# Patient Record
Sex: FEMALE | Race: WHITE | NOT HISPANIC OR LATINO | Employment: OTHER | URBAN - METROPOLITAN AREA
[De-identification: names, ages, dates, MRNs, and addresses within clinical notes are randomized per-mention and may not be internally consistent; named-entity substitution may affect disease eponyms.]

---

## 2023-03-30 DIAGNOSIS — E06.3 HYPOTHYROIDISM DUE TO HASHIMOTO'S THYROIDITIS: Primary | ICD-10-CM

## 2023-03-30 DIAGNOSIS — E03.8 HYPOTHYROIDISM DUE TO HASHIMOTO'S THYROIDITIS: Primary | ICD-10-CM

## 2023-03-30 RX ORDER — LEVOTHYROXINE SODIUM 100 UG/1
100 TABLET ORAL DAILY
Qty: 90 TABLET | Refills: 0 | Status: SHIPPED | OUTPATIENT
Start: 2023-03-30 | End: 2023-04-18 | Stop reason: SDUPTHER

## 2023-03-30 RX ORDER — LEVOTHYROXINE SODIUM 100 UG/1
1 TABLET ORAL DAILY
COMMUNITY
Start: 2012-08-21 | End: 2023-03-30 | Stop reason: SDUPTHER

## 2023-04-17 PROBLEM — G56.00 CARPAL TUNNEL SYNDROME: Status: ACTIVE | Noted: 2023-04-17

## 2023-04-17 PROBLEM — R91.8 LUNG NODULES: Status: ACTIVE | Noted: 2023-04-17

## 2023-04-17 PROBLEM — M17.0 PRIMARY OSTEOARTHRITIS OF BOTH KNEES: Status: ACTIVE | Noted: 2023-04-17

## 2023-04-17 PROBLEM — J40 BRONCHITIS: Status: ACTIVE | Noted: 2023-04-17

## 2023-04-17 PROBLEM — E03.9 HYPOTHYROIDISM: Status: ACTIVE | Noted: 2023-04-17

## 2023-04-17 PROBLEM — D64.9 ANEMIA: Status: ACTIVE | Noted: 2023-04-17

## 2023-04-17 PROBLEM — J20.9 ACUTE BRONCHITIS: Status: ACTIVE | Noted: 2023-04-17

## 2023-04-17 PROBLEM — M06.9 RHEUMATOID ARTHRITIS (MULTI): Status: ACTIVE | Noted: 2023-04-17

## 2023-04-17 PROBLEM — J44.1 COPD EXACERBATION (MULTI): Status: ACTIVE | Noted: 2023-04-17

## 2023-04-17 PROBLEM — J18.9 PNEUMONIA: Status: ACTIVE | Noted: 2023-04-17

## 2023-04-17 PROBLEM — K59.09 CHRONIC CONSTIPATION: Status: ACTIVE | Noted: 2023-04-17

## 2023-04-17 PROBLEM — R25.1 TREMOR OF BOTH HANDS: Status: ACTIVE | Noted: 2023-04-17

## 2023-04-17 PROBLEM — R06.02 SOB (SHORTNESS OF BREATH): Status: ACTIVE | Noted: 2023-04-17

## 2023-04-17 PROBLEM — G25.0 ESSENTIAL TREMOR: Status: ACTIVE | Noted: 2023-04-17

## 2023-04-17 PROBLEM — H04.123 DRY EYES: Status: ACTIVE | Noted: 2023-04-17

## 2023-04-17 PROBLEM — R26.9 ABNORMALITY OF GAIT AND MOBILITY: Status: ACTIVE | Noted: 2023-04-17

## 2023-04-17 PROBLEM — M17.9 OSTEOARTHRITIS OF KNEE: Status: ACTIVE | Noted: 2023-04-17

## 2023-04-17 PROBLEM — R73.01 IFG (IMPAIRED FASTING GLUCOSE): Status: ACTIVE | Noted: 2023-04-17

## 2023-04-17 PROBLEM — K80.20 CHOLELITHIASIS: Status: ACTIVE | Noted: 2023-04-17

## 2023-04-17 PROBLEM — R94.5 NONSPECIFIC ABNORMAL RESULTS OF FUNCTION STUDY OF LIVER: Status: ACTIVE | Noted: 2023-04-17

## 2023-04-17 PROBLEM — E86.0 DEHYDRATION: Status: ACTIVE | Noted: 2023-04-17

## 2023-04-17 PROBLEM — R25.1 TREMOR: Status: ACTIVE | Noted: 2023-04-17

## 2023-04-17 PROBLEM — M25.519 SHOULDER PAIN: Status: ACTIVE | Noted: 2023-04-17

## 2023-04-17 PROBLEM — F32.9 DEPRESSION, MAJOR: Status: ACTIVE | Noted: 2023-04-17

## 2023-04-17 PROBLEM — R32 URINARY INCONTINENCE: Status: ACTIVE | Noted: 2023-04-17

## 2023-04-17 PROBLEM — J44.9 CHRONIC OBSTRUCTIVE PULMONARY DISEASE (MULTI): Status: ACTIVE | Noted: 2023-04-17

## 2023-04-17 PROBLEM — U07.1 COVID-19 VIRUS INFECTION: Status: ACTIVE | Noted: 2023-04-17

## 2023-04-17 PROBLEM — A15.9 TUBERCULOSIS: Status: ACTIVE | Noted: 2023-04-17

## 2023-04-17 PROBLEM — E78.5 HYPERLIPIDEMIA: Status: ACTIVE | Noted: 2023-04-17

## 2023-04-17 PROBLEM — R42 VERTIGO: Status: ACTIVE | Noted: 2023-04-17

## 2023-04-17 PROBLEM — R42 DIZZINESS: Status: ACTIVE | Noted: 2023-04-17

## 2023-04-17 PROBLEM — I10 ESSENTIAL HYPERTENSION: Status: ACTIVE | Noted: 2023-04-17

## 2023-04-17 PROBLEM — F32.A DEPRESSION: Status: ACTIVE | Noted: 2023-04-17

## 2023-04-17 PROBLEM — E55.9 VITAMIN D DEFICIENCY: Status: ACTIVE | Noted: 2023-04-17

## 2023-04-17 PROBLEM — H66.90 ACUTE OTITIS MEDIA: Status: ACTIVE | Noted: 2023-04-17

## 2023-04-17 PROBLEM — G47.00 INSOMNIA: Status: ACTIVE | Noted: 2023-04-17

## 2023-04-17 PROBLEM — H92.09 EARACHE: Status: ACTIVE | Noted: 2023-04-17

## 2023-04-17 PROBLEM — R10.9 LEFT SIDED ABDOMINAL PAIN: Status: ACTIVE | Noted: 2023-04-17

## 2023-04-17 PROBLEM — M54.12 CERVICAL RADICULOPATHY: Status: ACTIVE | Noted: 2023-04-17

## 2023-04-17 RX ORDER — NEBIVOLOL 10 MG/1
10 TABLET ORAL DAILY
COMMUNITY
Start: 2023-02-21 | End: 2023-04-18 | Stop reason: SDUPTHER

## 2023-04-17 RX ORDER — ALBUTEROL SULFATE 90 UG/1
1 AEROSOL, METERED RESPIRATORY (INHALATION)
COMMUNITY
Start: 2014-11-18 | End: 2023-12-01 | Stop reason: SDUPTHER

## 2023-04-17 RX ORDER — FLUTICASONE PROPIONATE AND SALMETEROL 250; 50 UG/1; UG/1
1 POWDER RESPIRATORY (INHALATION)
COMMUNITY
Start: 2021-04-12

## 2023-04-17 RX ORDER — PROPRANOLOL HYDROCHLORIDE 10 MG/1
10 TABLET ORAL 2 TIMES DAILY
COMMUNITY
Start: 2022-05-19 | End: 2023-12-01 | Stop reason: SDUPTHER

## 2023-04-17 RX ORDER — DULOXETIN HYDROCHLORIDE 30 MG/1
30 CAPSULE, DELAYED RELEASE ORAL DAILY
COMMUNITY
Start: 2022-07-12 | End: 2023-04-18 | Stop reason: SDUPTHER

## 2023-04-17 RX ORDER — TRAMADOL HYDROCHLORIDE 50 MG/1
50 TABLET ORAL EVERY 6 HOURS
COMMUNITY
Start: 2021-04-05 | End: 2023-08-14 | Stop reason: SDUPTHER

## 2023-04-17 RX ORDER — MULTIVITAMIN
1 TABLET ORAL DAILY
COMMUNITY
Start: 2022-11-22 | End: 2023-05-23

## 2023-04-17 RX ORDER — PREDNISONE 20 MG/1
20 TABLET ORAL DAILY
COMMUNITY
Start: 2022-10-17

## 2023-04-17 RX ORDER — OMEPRAZOLE 20 MG/1
20 CAPSULE, DELAYED RELEASE ORAL DAILY
COMMUNITY
Start: 2020-09-02

## 2023-04-17 RX ORDER — DICLOFENAC SODIUM 30 MG/G
1 GEL TOPICAL 2 TIMES DAILY
COMMUNITY
Start: 2022-02-09 | End: 2023-12-01 | Stop reason: SDUPTHER

## 2023-04-17 RX ORDER — DOCUSATE SODIUM 100 MG/1
100 CAPSULE, LIQUID FILLED ORAL 2 TIMES DAILY
COMMUNITY
Start: 2022-01-04

## 2023-04-17 RX ORDER — MECLIZINE HYDROCHLORIDE 25 MG/1
25 TABLET ORAL
COMMUNITY
Start: 2017-01-27 | End: 2023-05-23

## 2023-04-17 RX ORDER — METHOTREXATE 2.5 MG/1
2.5 TABLET ORAL
COMMUNITY
Start: 2014-06-13

## 2023-04-17 RX ORDER — CALCIUM CARBONATE/VITAMIN D3 600MG-5MCG
1 TABLET ORAL 2 TIMES DAILY
COMMUNITY
Start: 2015-05-01

## 2023-04-17 RX ORDER — FLAXSEED OIL 1000 MG
1 CAPSULE ORAL
COMMUNITY
Start: 2021-10-05

## 2023-04-17 RX ORDER — ACETAMINOPHEN 500 MG
50 TABLET ORAL
COMMUNITY
Start: 2023-03-24 | End: 2023-05-23

## 2023-04-17 RX ORDER — CIPROFLOXACIN 0.5 MG/.25ML
0.25 SOLUTION/ DROPS AURICULAR (OTIC) 4 TIMES DAILY
COMMUNITY
Start: 2022-02-09

## 2023-04-17 RX ORDER — ACETAMINOPHEN 500 MG/1
1 CAPSULE, LIQUID FILLED ORAL 4 TIMES DAILY
COMMUNITY
Start: 2020-09-02

## 2023-04-17 RX ORDER — DICLOFENAC SODIUM 75 MG/1
75 TABLET, DELAYED RELEASE ORAL 2 TIMES DAILY
COMMUNITY
Start: 2023-02-20 | End: 2024-03-19 | Stop reason: SDUPTHER

## 2023-04-17 RX ORDER — POLYVINYL ALCOHOL 14 MG/ML
1 SOLUTION/ DROPS OPHTHALMIC AS NEEDED
COMMUNITY
Start: 2015-03-23

## 2023-04-17 RX ORDER — ADALIMUMAB 40MG/0.8ML
40 KIT SUBCUTANEOUS
COMMUNITY
Start: 2014-11-18

## 2023-04-17 RX ORDER — ACETAMINOPHEN 500 MG
500 TABLET ORAL
COMMUNITY
Start: 2020-09-02 | End: 2023-04-18 | Stop reason: SDUPTHER

## 2023-04-17 RX ORDER — PREDNISONE 10 MG/1
10 TABLET ORAL DAILY
COMMUNITY
Start: 2023-01-17

## 2023-04-17 RX ORDER — ATORVASTATIN CALCIUM 40 MG/1
40 TABLET, FILM COATED ORAL DAILY
COMMUNITY
Start: 2015-05-01 | End: 2023-04-18 | Stop reason: SDUPTHER

## 2023-04-17 RX ORDER — FERROUS SULFATE 325(65) MG
65 TABLET ORAL DAILY
COMMUNITY
Start: 2020-09-02 | End: 2023-12-01 | Stop reason: SDUPTHER

## 2023-04-17 RX ORDER — FLUTICASONE PROPIONATE 50 MCG
1 SPRAY, SUSPENSION (ML) NASAL DAILY
COMMUNITY
Start: 2023-02-13

## 2023-04-17 RX ORDER — ASPIRIN 81 MG/1
81 TABLET ORAL DAILY
COMMUNITY
Start: 2021-06-07 | End: 2023-05-23

## 2023-04-17 RX ORDER — IPRATROPIUM BROMIDE 17 UG/1
1 AEROSOL, METERED RESPIRATORY (INHALATION) 4 TIMES DAILY
COMMUNITY
Start: 2014-11-18

## 2023-04-17 RX ORDER — KETOROLAC TROMETHAMINE 5 MG/ML
SOLUTION OPHTHALMIC 4 TIMES DAILY
COMMUNITY
Start: 2022-10-24

## 2023-04-17 RX ORDER — OLOPATADINE HYDROCHLORIDE 1 MG/ML
1 SOLUTION/ DROPS OPHTHALMIC 2 TIMES DAILY
COMMUNITY
Start: 2022-02-09

## 2023-04-17 RX ORDER — AMMONIUM LACTATE 12 G/100G
1 CREAM TOPICAL
COMMUNITY
Start: 2020-10-05

## 2023-04-17 RX ORDER — ACETAMINOPHEN 500 MG
5 TABLET ORAL NIGHTLY
COMMUNITY
Start: 2019-03-05 | End: 2024-03-19 | Stop reason: SDUPTHER

## 2023-04-17 RX ORDER — LEVOMILNACIPRAN HYDROCHLORIDE 40 MG/1
40 CAPSULE, EXTENDED RELEASE ORAL DAILY
COMMUNITY
Start: 2023-01-17 | End: 2023-12-01 | Stop reason: SDUPTHER

## 2023-04-17 RX ORDER — ALBUTEROL SULFATE 0.83 MG/ML
2.5 SOLUTION RESPIRATORY (INHALATION) EVERY 6 HOURS PRN
COMMUNITY
Start: 2018-10-03 | End: 2023-12-01 | Stop reason: SDUPTHER

## 2023-04-17 RX ORDER — HYDROXYCHLOROQUINE SULFATE 200 MG/1
200 TABLET, FILM COATED ORAL 2 TIMES DAILY
COMMUNITY
Start: 2012-08-21 | End: 2023-12-01 | Stop reason: SDUPTHER

## 2023-04-17 RX ORDER — MULTIVIT/IRON SULF/FOLIC ACID 15MG-0.4MG
1 TABLET ORAL
COMMUNITY
Start: 2023-03-24 | End: 2023-04-18 | Stop reason: SDUPTHER

## 2023-04-17 RX ORDER — CHOLECALCIFEROL (VITAMIN D3) 50 MCG
2000 TABLET ORAL DAILY
COMMUNITY
Start: 2019-09-03 | End: 2023-12-01 | Stop reason: SDUPTHER

## 2023-04-17 RX ORDER — FOLIC ACID 1 MG/1
1 TABLET ORAL DAILY
COMMUNITY
Start: 2014-06-13 | End: 2023-12-01 | Stop reason: SDUPTHER

## 2023-04-18 ENCOUNTER — OFFICE VISIT (OUTPATIENT)
Dept: PRIMARY CARE | Facility: CLINIC | Age: 83
End: 2023-04-18
Payer: COMMERCIAL

## 2023-04-18 VITALS — SYSTOLIC BLOOD PRESSURE: 130 MMHG | TEMPERATURE: 96.8 F | DIASTOLIC BLOOD PRESSURE: 90 MMHG

## 2023-04-18 DIAGNOSIS — E03.8 HYPOTHYROIDISM DUE TO HASHIMOTO'S THYROIDITIS: ICD-10-CM

## 2023-04-18 DIAGNOSIS — I10 ESSENTIAL HYPERTENSION: ICD-10-CM

## 2023-04-18 DIAGNOSIS — J44.9 CHRONIC OBSTRUCTIVE PULMONARY DISEASE, UNSPECIFIED COPD TYPE (MULTI): ICD-10-CM

## 2023-04-18 DIAGNOSIS — M17.9 OSTEOARTHRITIS OF KNEE, UNSPECIFIED LATERALITY, UNSPECIFIED OSTEOARTHRITIS TYPE: ICD-10-CM

## 2023-04-18 DIAGNOSIS — E06.3 HYPOTHYROIDISM DUE TO HASHIMOTO'S THYROIDITIS: ICD-10-CM

## 2023-04-18 DIAGNOSIS — G25.0 ESSENTIAL TREMOR: Primary | ICD-10-CM

## 2023-04-18 DIAGNOSIS — E78.5 HYPERLIPIDEMIA, UNSPECIFIED HYPERLIPIDEMIA TYPE: ICD-10-CM

## 2023-04-18 PROCEDURE — 3075F SYST BP GE 130 - 139MM HG: CPT | Performed by: INTERNAL MEDICINE

## 2023-04-18 PROCEDURE — 99214 OFFICE O/P EST MOD 30 MIN: CPT | Performed by: INTERNAL MEDICINE

## 2023-04-18 PROCEDURE — 3080F DIAST BP >= 90 MM HG: CPT | Performed by: INTERNAL MEDICINE

## 2023-04-18 RX ORDER — LEVOTHYROXINE SODIUM 100 UG/1
100 TABLET ORAL DAILY
Qty: 90 TABLET | Refills: 0 | Status: SHIPPED | OUTPATIENT
Start: 2023-04-18 | End: 2023-07-18

## 2023-04-18 RX ORDER — MULTIVIT/IRON SULF/FOLIC ACID 15MG-0.4MG
1 TABLET ORAL DAILY
Qty: 90 TABLET | Refills: 0 | Status: SHIPPED | OUTPATIENT
Start: 2023-04-18 | End: 2023-07-18

## 2023-04-18 RX ORDER — NEBIVOLOL 10 MG/1
10 TABLET ORAL DAILY
Qty: 90 TABLET | Refills: 0 | Status: SHIPPED | OUTPATIENT
Start: 2023-04-18 | End: 2023-12-01 | Stop reason: SDUPTHER

## 2023-04-18 RX ORDER — DULOXETIN HYDROCHLORIDE 30 MG/1
30 CAPSULE, DELAYED RELEASE ORAL DAILY
Qty: 90 CAPSULE | Refills: 0 | Status: SHIPPED | OUTPATIENT
Start: 2023-04-18 | End: 2023-12-01 | Stop reason: SDUPTHER

## 2023-04-18 RX ORDER — ATORVASTATIN CALCIUM 40 MG/1
40 TABLET, FILM COATED ORAL DAILY
Qty: 90 TABLET | Refills: 0 | Status: SHIPPED | OUTPATIENT
Start: 2023-04-18 | End: 2023-07-19 | Stop reason: SDUPTHER

## 2023-04-18 RX ORDER — ACETAMINOPHEN 500 MG
500 TABLET ORAL EVERY 6 HOURS PRN
Qty: 30 TABLET | Refills: 3 | Status: SHIPPED | OUTPATIENT
Start: 2023-04-18

## 2023-04-18 ASSESSMENT — ENCOUNTER SYMPTOMS
CARDIOVASCULAR NEGATIVE: 1
RESPIRATORY NEGATIVE: 1
GASTROINTESTINAL NEGATIVE: 1
CONSTITUTIONAL NEGATIVE: 1

## 2023-04-18 NOTE — PROGRESS NOTES
Subjective   Patient ID: Makenna Black is a 82 y.o. female who presents for Follow-up.  HPI    Review of Systems   Constitutional: Negative.    Respiratory: Negative.     Cardiovascular: Negative.    Gastrointestinal: Negative.        Objective   Physical Exam  Constitutional:       Appearance: She is well-developed.   Cardiovascular:      Rate and Rhythm: Normal rate and regular rhythm.      Heart sounds: Normal heart sounds. No murmur heard.  Pulmonary:      Effort: Pulmonary effort is normal.      Breath sounds: Normal breath sounds.   Abdominal:      General: Bowel sounds are normal.      Palpations: Abdomen is soft.         Assessment/Plan   Problem List Items Addressed This Visit          Nervous    Essential tremor - Primary    Relevant Medications    levothyroxine (Synthroid, Levoxyl) 100 mcg tablet    acetaminophen (Tylenol) 500 mg tablet    atorvastatin (Lipitor) 40 mg tablet    DULoxetine (Cymbalta) 30 mg DR capsule    nebivolol (Bystolic) 10 mg tablet    multivit-iron sulf-folic acid (Tab-A-Christel Multivitamin w-iron) 15 mg iron- 400 mcg tablet       Respiratory    Chronic obstructive pulmonary disease (CMS/HCC)       Circulatory    Essential hypertension    Relevant Medications    levothyroxine (Synthroid, Levoxyl) 100 mcg tablet    acetaminophen (Tylenol) 500 mg tablet    atorvastatin (Lipitor) 40 mg tablet    DULoxetine (Cymbalta) 30 mg DR capsule    nebivolol (Bystolic) 10 mg tablet    multivit-iron sulf-folic acid (Tab-A-Christel Multivitamin w-iron) 15 mg iron- 400 mcg tablet       Musculoskeletal    Osteoarthritis of knee    Relevant Medications    levothyroxine (Synthroid, Levoxyl) 100 mcg tablet    acetaminophen (Tylenol) 500 mg tablet    atorvastatin (Lipitor) 40 mg tablet    DULoxetine (Cymbalta) 30 mg DR capsule    nebivolol (Bystolic) 10 mg tablet    multivit-iron sulf-folic acid (Tab-A-Christel Multivitamin w-iron) 15 mg iron- 400 mcg tablet       Endocrine/Metabolic    Hypothyroidism    Relevant  Medications    levothyroxine (Synthroid, Levoxyl) 100 mcg tablet    acetaminophen (Tylenol) 500 mg tablet    atorvastatin (Lipitor) 40 mg tablet    DULoxetine (Cymbalta) 30 mg DR capsule    nebivolol (Bystolic) 10 mg tablet    multivit-iron sulf-folic acid (Tab-A-Christel Multivitamin w-iron) 15 mg iron- 400 mcg tablet       Other    Hyperlipidemia        HTN.  Well controlled.  Continue with current medications.  Check BP at home daily.  Report to us if the numbers are higher than 130/80.      HLD  Stable  Continue with statins  Check lipids  The patient is here for a follow up on chronic medical problems.  His medications were reviewed, pharmacy updated, notes reviewed, prior labs reviewed.  Lab Results   Component Value Date    WBC 8.1 10/11/2022    HGB 14.0 10/11/2022    HCT 42.5 10/11/2022     10/11/2022    CHOL 234 (H) 10/11/2022    TRIG 119 10/11/2022    HDL 80.4 10/11/2022    ALT 28 01/17/2023    AST 18 01/17/2023     01/17/2023    K 4.6 01/17/2023     01/17/2023    CREATININE 0.64 01/17/2023    BUN 21 01/17/2023    CO2 31 01/17/2023    TSH 0.63 01/17/2023    HGBA1C 5.6 01/17/2023     par  MDM  1) COMPLEXITY: MORE THAN 1 STABLE CHRONIC CONDITION ADDRESSED OR 1 ACUTE ILLNESS ADDRESSED   2)DATA: TESTS INTERPRETED AND OR ORDERED, TOOK INDEPENDENT HISTORY OR RECORDS REVIEWED  3)RISK: MODERATE RISK DUE TO NATURE OF MEDICAL CONDITIONS/COMORBIDITY OR MEDICATIONS ORDERED OR SURGICAL OR PROCEDURE REFERRAL         Ana M Gongora MD

## 2023-04-21 DIAGNOSIS — R26.9 ABNORMALITY OF GAIT AND MOBILITY: ICD-10-CM

## 2023-04-21 DIAGNOSIS — G56.00 CARPAL TUNNEL SYNDROME, UNSPECIFIED LATERALITY: ICD-10-CM

## 2023-05-23 DIAGNOSIS — E55.9 VITAMIN D DEFICIENCY: ICD-10-CM

## 2023-05-23 DIAGNOSIS — R42 VERTIGO: Primary | ICD-10-CM

## 2023-05-23 RX ORDER — ACETAMINOPHEN 500 MG
TABLET ORAL
Qty: 30 CAPSULE | Refills: 2 | Status: SHIPPED | OUTPATIENT
Start: 2023-05-23 | End: 2023-08-14

## 2023-05-23 RX ORDER — ASPIRIN 81 MG/1
TABLET ORAL
Qty: 30 TABLET | Refills: 2 | Status: SHIPPED | OUTPATIENT
Start: 2023-05-23 | End: 2023-08-14

## 2023-05-23 RX ORDER — MULTIVITAMIN
TABLET ORAL
Qty: 60 TABLET | Refills: 2 | Status: SHIPPED | OUTPATIENT
Start: 2023-05-23 | End: 2023-08-14

## 2023-05-23 RX ORDER — MECLIZINE HYDROCHLORIDE 25 MG/1
TABLET ORAL
Qty: 90 TABLET | Refills: 0 | Status: SHIPPED | OUTPATIENT
Start: 2023-05-23

## 2023-07-06 DIAGNOSIS — M06.9 RHEUMATOID ARTHRITIS, INVOLVING UNSPECIFIED SITE, UNSPECIFIED WHETHER RHEUMATOID FACTOR PRESENT (MULTI): ICD-10-CM

## 2023-07-06 DIAGNOSIS — R26.9 ABNORMALITY OF GAIT AND MOBILITY: ICD-10-CM

## 2023-07-06 DIAGNOSIS — J44.1 COPD EXACERBATION (MULTI): ICD-10-CM

## 2023-07-17 DIAGNOSIS — G25.0 ESSENTIAL TREMOR: ICD-10-CM

## 2023-07-17 DIAGNOSIS — E03.8 HYPOTHYROIDISM DUE TO HASHIMOTO'S THYROIDITIS: ICD-10-CM

## 2023-07-17 DIAGNOSIS — M17.9 OSTEOARTHRITIS OF KNEE, UNSPECIFIED LATERALITY, UNSPECIFIED OSTEOARTHRITIS TYPE: ICD-10-CM

## 2023-07-17 DIAGNOSIS — E06.3 HYPOTHYROIDISM DUE TO HASHIMOTO'S THYROIDITIS: ICD-10-CM

## 2023-07-17 DIAGNOSIS — D64.9 ANEMIA, UNSPECIFIED TYPE: ICD-10-CM

## 2023-07-17 DIAGNOSIS — I10 ESSENTIAL HYPERTENSION: ICD-10-CM

## 2023-07-18 RX ORDER — MULTIVIT/IRON SULF/FOLIC ACID 15MG-0.4MG
1 TABLET ORAL DAILY
Qty: 90 TABLET | Refills: 0 | Status: SHIPPED | OUTPATIENT
Start: 2023-07-18 | End: 2023-10-03

## 2023-07-18 RX ORDER — LEVOTHYROXINE SODIUM 100 UG/1
100 TABLET ORAL DAILY
Qty: 90 TABLET | Refills: 0 | Status: SHIPPED | OUTPATIENT
Start: 2023-07-18 | End: 2023-09-05

## 2023-07-19 DIAGNOSIS — E06.3 HYPOTHYROIDISM DUE TO HASHIMOTO'S THYROIDITIS: ICD-10-CM

## 2023-07-19 DIAGNOSIS — I10 ESSENTIAL HYPERTENSION: ICD-10-CM

## 2023-07-19 DIAGNOSIS — E03.8 HYPOTHYROIDISM DUE TO HASHIMOTO'S THYROIDITIS: ICD-10-CM

## 2023-07-19 DIAGNOSIS — G25.0 ESSENTIAL TREMOR: ICD-10-CM

## 2023-07-19 DIAGNOSIS — E78.5 HYPERLIPIDEMIA, UNSPECIFIED HYPERLIPIDEMIA TYPE: ICD-10-CM

## 2023-07-19 DIAGNOSIS — M17.9 OSTEOARTHRITIS OF KNEE, UNSPECIFIED LATERALITY, UNSPECIFIED OSTEOARTHRITIS TYPE: ICD-10-CM

## 2023-07-19 RX ORDER — ATORVASTATIN CALCIUM 40 MG/1
40 TABLET, FILM COATED ORAL DAILY
Qty: 90 TABLET | Refills: 0 | Status: SHIPPED | OUTPATIENT
Start: 2023-07-19 | End: 2023-10-04

## 2023-08-13 DIAGNOSIS — E55.9 VITAMIN D DEFICIENCY: ICD-10-CM

## 2023-08-13 DIAGNOSIS — R42 VERTIGO: ICD-10-CM

## 2023-08-14 ENCOUNTER — OFFICE VISIT (OUTPATIENT)
Dept: PRIMARY CARE | Facility: CLINIC | Age: 83
End: 2023-08-14
Payer: COMMERCIAL

## 2023-08-14 ENCOUNTER — LAB (OUTPATIENT)
Dept: LAB | Facility: LAB | Age: 83
End: 2023-08-14
Payer: COMMERCIAL

## 2023-08-14 VITALS — WEIGHT: 186 LBS | BODY MASS INDEX: 36.33 KG/M2 | SYSTOLIC BLOOD PRESSURE: 140 MMHG | DIASTOLIC BLOOD PRESSURE: 90 MMHG

## 2023-08-14 DIAGNOSIS — E03.8 HYPOTHYROIDISM DUE TO HASHIMOTO'S THYROIDITIS: ICD-10-CM

## 2023-08-14 DIAGNOSIS — E78.5 HYPERLIPIDEMIA, UNSPECIFIED HYPERLIPIDEMIA TYPE: ICD-10-CM

## 2023-08-14 DIAGNOSIS — M06.9 RHEUMATOID ARTHRITIS, INVOLVING UNSPECIFIED SITE, UNSPECIFIED WHETHER RHEUMATOID FACTOR PRESENT (MULTI): ICD-10-CM

## 2023-08-14 DIAGNOSIS — E06.3 HYPOTHYROIDISM DUE TO HASHIMOTO'S THYROIDITIS: ICD-10-CM

## 2023-08-14 DIAGNOSIS — I10 ESSENTIAL HYPERTENSION: Primary | ICD-10-CM

## 2023-08-14 DIAGNOSIS — I10 ESSENTIAL HYPERTENSION: ICD-10-CM

## 2023-08-14 DIAGNOSIS — R73.01 IFG (IMPAIRED FASTING GLUCOSE): ICD-10-CM

## 2023-08-14 DIAGNOSIS — M17.0 PRIMARY OSTEOARTHRITIS OF BOTH KNEES: ICD-10-CM

## 2023-08-14 DIAGNOSIS — E55.9 VITAMIN D DEFICIENCY: ICD-10-CM

## 2023-08-14 LAB
ALANINE AMINOTRANSFERASE (SGPT) (U/L) IN SER/PLAS: 21 U/L (ref 7–45)
ALBUMIN (G/DL) IN SER/PLAS: 4.4 G/DL (ref 3.4–5)
ALKALINE PHOSPHATASE (U/L) IN SER/PLAS: 43 U/L (ref 33–136)
ANION GAP IN SER/PLAS: 14 MMOL/L (ref 10–20)
ASPARTATE AMINOTRANSFERASE (SGOT) (U/L) IN SER/PLAS: 21 U/L (ref 9–39)
BILIRUBIN TOTAL (MG/DL) IN SER/PLAS: 0.4 MG/DL (ref 0–1.2)
CALCIDIOL (25 OH VITAMIN D3) (NG/ML) IN SER/PLAS: 77 NG/ML
CALCIUM (MG/DL) IN SER/PLAS: 9.9 MG/DL (ref 8.6–10.6)
CARBON DIOXIDE, TOTAL (MMOL/L) IN SER/PLAS: 29 MMOL/L (ref 21–32)
CHLORIDE (MMOL/L) IN SER/PLAS: 104 MMOL/L (ref 98–107)
CHOLESTEROL (MG/DL) IN SER/PLAS: 247 MG/DL (ref 0–199)
CHOLESTEROL IN HDL (MG/DL) IN SER/PLAS: 77.3 MG/DL
CHOLESTEROL/HDL RATIO: 3.2
CREATININE (MG/DL) IN SER/PLAS: 0.68 MG/DL (ref 0.5–1.05)
ERYTHROCYTE DISTRIBUTION WIDTH (RATIO) BY AUTOMATED COUNT: 13.6 % (ref 11.5–14.5)
ERYTHROCYTE MEAN CORPUSCULAR HEMOGLOBIN CONCENTRATION (G/DL) BY AUTOMATED: 31.5 G/DL (ref 32–36)
ERYTHROCYTE MEAN CORPUSCULAR VOLUME (FL) BY AUTOMATED COUNT: 94 FL (ref 80–100)
ERYTHROCYTES (10*6/UL) IN BLOOD BY AUTOMATED COUNT: 4.75 X10E12/L (ref 4–5.2)
ESTIMATED AVERAGE GLUCOSE FOR HBA1C: 108 MG/DL
GFR FEMALE: 87 ML/MIN/1.73M2
GLUCOSE (MG/DL) IN SER/PLAS: 96 MG/DL (ref 74–99)
HEMATOCRIT (%) IN BLOOD BY AUTOMATED COUNT: 44.7 % (ref 36–46)
HEMOGLOBIN (G/DL) IN BLOOD: 14.1 G/DL (ref 12–16)
HEMOGLOBIN A1C/HEMOGLOBIN TOTAL IN BLOOD: 5.4 %
LDL: 139 MG/DL (ref 0–99)
LEUKOCYTES (10*3/UL) IN BLOOD BY AUTOMATED COUNT: 6.7 X10E9/L (ref 4.4–11.3)
NRBC (PER 100 WBCS) BY AUTOMATED COUNT: 0 /100 WBC (ref 0–0)
PLATELETS (10*3/UL) IN BLOOD AUTOMATED COUNT: 243 X10E9/L (ref 150–450)
POTASSIUM (MMOL/L) IN SER/PLAS: 4.4 MMOL/L (ref 3.5–5.3)
PROTEIN TOTAL: 6.9 G/DL (ref 6.4–8.2)
SODIUM (MMOL/L) IN SER/PLAS: 143 MMOL/L (ref 136–145)
THYROTROPIN (MIU/L) IN SER/PLAS BY DETECTION LIMIT <= 0.05 MIU/L: 0.5 MIU/L (ref 0.44–3.98)
TRIGLYCERIDE (MG/DL) IN SER/PLAS: 152 MG/DL (ref 0–149)
UREA NITROGEN (MG/DL) IN SER/PLAS: 10 MG/DL (ref 6–23)
VLDL: 30 MG/DL (ref 0–40)

## 2023-08-14 PROCEDURE — 82306 VITAMIN D 25 HYDROXY: CPT

## 2023-08-14 PROCEDURE — 85027 COMPLETE CBC AUTOMATED: CPT

## 2023-08-14 PROCEDURE — 83036 HEMOGLOBIN GLYCOSYLATED A1C: CPT

## 2023-08-14 PROCEDURE — 80061 LIPID PANEL: CPT

## 2023-08-14 PROCEDURE — 80053 COMPREHEN METABOLIC PANEL: CPT

## 2023-08-14 PROCEDURE — 36415 COLL VENOUS BLD VENIPUNCTURE: CPT

## 2023-08-14 PROCEDURE — 84443 ASSAY THYROID STIM HORMONE: CPT

## 2023-08-14 PROCEDURE — 3077F SYST BP >= 140 MM HG: CPT | Performed by: INTERNAL MEDICINE

## 2023-08-14 PROCEDURE — 99214 OFFICE O/P EST MOD 30 MIN: CPT | Performed by: INTERNAL MEDICINE

## 2023-08-14 PROCEDURE — 3080F DIAST BP >= 90 MM HG: CPT | Performed by: INTERNAL MEDICINE

## 2023-08-14 RX ORDER — ACETAMINOPHEN 500 MG
TABLET ORAL
Qty: 30 CAPSULE | Refills: 2 | Status: SHIPPED | OUTPATIENT
Start: 2023-08-14 | End: 2023-10-31

## 2023-08-14 RX ORDER — MULTIVITAMIN
TABLET ORAL
Qty: 60 TABLET | Refills: 2 | Status: SHIPPED | OUTPATIENT
Start: 2023-08-14 | End: 2023-09-26

## 2023-08-14 RX ORDER — CELECOXIB 100 MG/1
100 CAPSULE ORAL 2 TIMES DAILY
Qty: 60 CAPSULE | Refills: 2 | Status: SHIPPED | OUTPATIENT
Start: 2023-08-14 | End: 2023-11-12

## 2023-08-14 RX ORDER — ASPIRIN 81 MG/1
TABLET ORAL
Qty: 30 TABLET | Refills: 2 | Status: SHIPPED | OUTPATIENT
Start: 2023-08-14 | End: 2023-10-31

## 2023-08-14 RX ORDER — TRAMADOL HYDROCHLORIDE 50 MG/1
50 TABLET ORAL EVERY 8 HOURS PRN
Qty: 18 TABLET | Refills: 0 | Status: SHIPPED | OUTPATIENT
Start: 2023-08-14 | End: 2023-08-20

## 2023-08-14 ASSESSMENT — ENCOUNTER SYMPTOMS
ARTHRALGIAS: 1
BACK PAIN: 1
CARDIOVASCULAR NEGATIVE: 1
GASTROINTESTINAL NEGATIVE: 1
CONSTITUTIONAL NEGATIVE: 1
RESPIRATORY NEGATIVE: 1

## 2023-08-14 NOTE — PROGRESS NOTES
Subjective   Patient ID: Makenna Black is a 83 y.o. female who presents for Follow-up.  HPI    Review of Systems   Constitutional: Negative.    Respiratory: Negative.     Cardiovascular: Negative.    Gastrointestinal: Negative.    Musculoskeletal:  Positive for arthralgias, back pain and gait problem.       Objective   Physical Exam  Constitutional:       Appearance: She is well-developed.   Cardiovascular:      Rate and Rhythm: Normal rate and regular rhythm.      Heart sounds: Normal heart sounds. No murmur heard.  Pulmonary:      Effort: Pulmonary effort is normal.      Breath sounds: Normal breath sounds.   Abdominal:      General: Bowel sounds are normal.      Palpations: Abdomen is soft.     Significant OA changes of all joints    Assessment/Plan   Problem List Items Addressed This Visit       Essential hypertension - Primary    Relevant Orders    CBC    Comprehensive Metabolic Panel    TSH with reflex to Free T4 if abnormal    Hemoglobin A1C    Lipid Panel    Vitamin D, Total    Hyperlipidemia    Relevant Orders    CBC    Comprehensive Metabolic Panel    TSH with reflex to Free T4 if abnormal    Hemoglobin A1C    Lipid Panel    Vitamin D, Total    Hypothyroidism    Relevant Orders    CBC    Comprehensive Metabolic Panel    TSH with reflex to Free T4 if abnormal    Hemoglobin A1C    Lipid Panel    Vitamin D, Total    IFG (impaired fasting glucose)    Relevant Orders    CBC    Comprehensive Metabolic Panel    TSH with reflex to Free T4 if abnormal    Hemoglobin A1C    Lipid Panel    Vitamin D, Total    Primary osteoarthritis of both knees    Relevant Orders    Referral to Physical Therapy    Rheumatoid arthritis (CMS/[facility])    Relevant Medications    celecoxib (CeleBREX) 100 mg capsule    traMADol (Ultram) 50 mg tablet    Vitamin D deficiency    Relevant Orders    CBC    Comprehensive Metabolic Panel    TSH with reflex to Free T4 if abnormal    Hemoglobin A1C    Lipid Panel    Vitamin D, Total     The patient is here  for a follow up on chronic medical problems.  His medications were reviewed, pharmacy updated, notes reviewed, prior labs reviewed.    Severe OA  Worsening significantly  Recommend Celebrex BID and Tramadol for severe pain  She has seen ortho before and was told it's too risky to do the surgery on her       HTN.  Well controlled.  Continue with current medications.  Check BP at home daily.  Report to us if the numbers are higher than 130/80.        HLD  Stable  Continue with statins  Check lipids             Ana M Gongora MD

## 2023-08-15 NOTE — RESULT ENCOUNTER NOTE
Results reviewed.  There are some minor abnormalities, which are not concerning.  No changes in medications are needed at this time.  Please continue with current treatment.    Best,  Dr. Viramontes

## 2023-09-05 DIAGNOSIS — E03.8 HYPOTHYROIDISM DUE TO HASHIMOTO'S THYROIDITIS: ICD-10-CM

## 2023-09-05 DIAGNOSIS — E06.3 HYPOTHYROIDISM DUE TO HASHIMOTO'S THYROIDITIS: ICD-10-CM

## 2023-09-05 RX ORDER — LEVOTHYROXINE SODIUM 100 UG/1
100 TABLET ORAL DAILY
Qty: 90 TABLET | Refills: 0 | Status: SHIPPED | OUTPATIENT
Start: 2023-09-05 | End: 2023-10-25

## 2023-09-26 DIAGNOSIS — E55.9 VITAMIN D DEFICIENCY: ICD-10-CM

## 2023-09-26 DIAGNOSIS — R42 VERTIGO: ICD-10-CM

## 2023-09-26 RX ORDER — MULTIVITAMIN
TABLET ORAL
Qty: 60 TABLET | Refills: 2 | Status: SHIPPED | OUTPATIENT
Start: 2023-09-26 | End: 2023-12-01 | Stop reason: SDUPTHER

## 2023-10-03 DIAGNOSIS — D64.9 ANEMIA, UNSPECIFIED TYPE: ICD-10-CM

## 2023-10-03 RX ORDER — MULTIVIT/IRON SULF/FOLIC ACID 15MG-0.4MG
1 TABLET ORAL DAILY
Qty: 30 TABLET | Refills: 2 | Status: SHIPPED | OUTPATIENT
Start: 2023-10-03 | End: 2023-10-25

## 2023-10-04 DIAGNOSIS — E78.5 HYPERLIPIDEMIA, UNSPECIFIED HYPERLIPIDEMIA TYPE: ICD-10-CM

## 2023-10-04 RX ORDER — ATORVASTATIN CALCIUM 40 MG/1
40 TABLET, FILM COATED ORAL DAILY
Qty: 90 TABLET | Refills: 0 | Status: SHIPPED | OUTPATIENT
Start: 2023-10-04 | End: 2023-10-31

## 2023-10-31 DIAGNOSIS — E55.9 VITAMIN D DEFICIENCY: ICD-10-CM

## 2023-10-31 DIAGNOSIS — E78.5 HYPERLIPIDEMIA, UNSPECIFIED HYPERLIPIDEMIA TYPE: ICD-10-CM

## 2023-10-31 DIAGNOSIS — R42 VERTIGO: ICD-10-CM

## 2023-10-31 RX ORDER — ACETAMINOPHEN 500 MG
TABLET ORAL
Qty: 30 CAPSULE | Refills: 2 | Status: SHIPPED | OUTPATIENT
Start: 2023-10-31 | End: 2024-04-15 | Stop reason: SDUPTHER

## 2023-10-31 RX ORDER — ATORVASTATIN CALCIUM 40 MG/1
40 TABLET, FILM COATED ORAL DAILY
Qty: 90 TABLET | Refills: 0 | Status: SHIPPED | OUTPATIENT
Start: 2023-10-31 | End: 2023-12-01 | Stop reason: SDUPTHER

## 2023-10-31 RX ORDER — ASPIRIN 81 MG/1
TABLET ORAL
Qty: 30 TABLET | Refills: 2 | Status: SHIPPED | OUTPATIENT
Start: 2023-10-31 | End: 2023-12-01 | Stop reason: SDUPTHER

## 2023-11-08 ENCOUNTER — TELEMEDICINE (OUTPATIENT)
Dept: PRIMARY CARE | Facility: CLINIC | Age: 83
End: 2023-11-08
Payer: COMMERCIAL

## 2023-11-08 DIAGNOSIS — B00.9 HERPES SIMPLEX: Primary | ICD-10-CM

## 2023-11-08 PROCEDURE — 99213 OFFICE O/P EST LOW 20 MIN: CPT | Performed by: INTERNAL MEDICINE

## 2023-11-08 RX ORDER — VALACYCLOVIR HYDROCHLORIDE 500 MG/1
500 TABLET, FILM COATED ORAL 2 TIMES DAILY
Qty: 14 TABLET | Refills: 2 | Status: SHIPPED | OUTPATIENT
Start: 2023-11-08 | End: 2023-11-29

## 2023-11-08 ASSESSMENT — ENCOUNTER SYMPTOMS
CONSTITUTIONAL NEGATIVE: 1
GASTROINTESTINAL NEGATIVE: 1
RESPIRATORY NEGATIVE: 1
CARDIOVASCULAR NEGATIVE: 1

## 2023-11-08 NOTE — PROGRESS NOTES
Subjective   Patient ID: Makenna Black is a 83 y.o. female who presents for Follow-up.  HPI    Review of Systems   Constitutional: Negative.    Respiratory: Negative.     Cardiovascular: Negative.    Gastrointestinal: Negative.        Objective   Physical Exam  Neurological:      Mental Status: She is alert.   Psychiatric:         Mood and Affect: Mood normal.         Assessment/Plan   Problem List Items Addressed This Visit    None  Visit Diagnoses       Herpes simplex    -  Primary    Relevant Medications    valACYclovir (Valtrex) 500 mg tablet          Herpes labialis  Valtrex called in  Avoid direct contact    Back pain  Getting acupuncture and is helping a lot  Continue with current    An interactive audio and video telecommunication system which permits real time communications between the patient (at the originating site) and provider (at the distant site) was utilized to provide this telehealth service.    Verbal consent was requested and obtained from the patient on the day of encounter.         Ana M Gongora MD

## 2023-11-20 ENCOUNTER — APPOINTMENT (OUTPATIENT)
Dept: PRIMARY CARE | Facility: CLINIC | Age: 83
End: 2023-11-20
Payer: COMMERCIAL

## 2023-12-01 ENCOUNTER — OFFICE VISIT (OUTPATIENT)
Dept: PRIMARY CARE | Facility: CLINIC | Age: 83
End: 2023-12-01
Payer: COMMERCIAL

## 2023-12-01 VITALS
HEART RATE: 76 BPM | DIASTOLIC BLOOD PRESSURE: 86 MMHG | WEIGHT: 189 LBS | BODY MASS INDEX: 36.91 KG/M2 | SYSTOLIC BLOOD PRESSURE: 147 MMHG

## 2023-12-01 DIAGNOSIS — E55.9 VITAMIN D DEFICIENCY: ICD-10-CM

## 2023-12-01 DIAGNOSIS — E78.5 HYPERLIPIDEMIA, UNSPECIFIED HYPERLIPIDEMIA TYPE: ICD-10-CM

## 2023-12-01 DIAGNOSIS — Z00.00 ROUTINE GENERAL MEDICAL EXAMINATION AT A HEALTH CARE FACILITY: ICD-10-CM

## 2023-12-01 DIAGNOSIS — F32.4 MAJOR DEPRESSIVE DISORDER IN PARTIAL REMISSION, UNSPECIFIED WHETHER RECURRENT (CMS-HCC): ICD-10-CM

## 2023-12-01 DIAGNOSIS — J44.9 CHRONIC OBSTRUCTIVE PULMONARY DISEASE, UNSPECIFIED COPD TYPE (MULTI): ICD-10-CM

## 2023-12-01 DIAGNOSIS — J84.10 PULMONARY FIBROSIS (MULTI): ICD-10-CM

## 2023-12-01 DIAGNOSIS — M17.9 OSTEOARTHRITIS OF KNEE, UNSPECIFIED LATERALITY, UNSPECIFIED OSTEOARTHRITIS TYPE: ICD-10-CM

## 2023-12-01 DIAGNOSIS — G25.0 ESSENTIAL TREMOR: ICD-10-CM

## 2023-12-01 DIAGNOSIS — D64.9 ANEMIA, UNSPECIFIED TYPE: ICD-10-CM

## 2023-12-01 DIAGNOSIS — R42 VERTIGO: ICD-10-CM

## 2023-12-01 DIAGNOSIS — I27.21 PULMONARY ARTERIAL HYPERTENSION (MULTI): ICD-10-CM

## 2023-12-01 DIAGNOSIS — M17.0 PRIMARY OSTEOARTHRITIS OF BOTH KNEES: ICD-10-CM

## 2023-12-01 DIAGNOSIS — E06.3 HYPOTHYROIDISM DUE TO HASHIMOTO'S THYROIDITIS: ICD-10-CM

## 2023-12-01 DIAGNOSIS — I10 ESSENTIAL HYPERTENSION: Primary | ICD-10-CM

## 2023-12-01 DIAGNOSIS — E03.8 HYPOTHYROIDISM DUE TO HASHIMOTO'S THYROIDITIS: ICD-10-CM

## 2023-12-01 DIAGNOSIS — M06.9 RHEUMATOID ARTHRITIS, INVOLVING UNSPECIFIED SITE, UNSPECIFIED WHETHER RHEUMATOID FACTOR PRESENT (MULTI): ICD-10-CM

## 2023-12-01 DIAGNOSIS — R73.01 IFG (IMPAIRED FASTING GLUCOSE): ICD-10-CM

## 2023-12-01 DIAGNOSIS — E66.01 OBESITY, MORBID (MULTI): ICD-10-CM

## 2023-12-01 DIAGNOSIS — E61.1 IRON DEFICIENCY: ICD-10-CM

## 2023-12-01 PROCEDURE — 99214 OFFICE O/P EST MOD 30 MIN: CPT | Performed by: INTERNAL MEDICINE

## 2023-12-01 PROCEDURE — 3079F DIAST BP 80-89 MM HG: CPT | Performed by: INTERNAL MEDICINE

## 2023-12-01 PROCEDURE — 3077F SYST BP >= 140 MM HG: CPT | Performed by: INTERNAL MEDICINE

## 2023-12-01 RX ORDER — LEVOTHYROXINE SODIUM 100 UG/1
100 TABLET ORAL DAILY
Qty: 90 TABLET | Refills: 0 | Status: SHIPPED | OUTPATIENT
Start: 2023-12-01 | End: 2024-01-15 | Stop reason: SDUPTHER

## 2023-12-01 RX ORDER — ALBUTEROL SULFATE 90 UG/1
1 AEROSOL, METERED RESPIRATORY (INHALATION) EVERY 4 HOURS PRN
Qty: 18 G | Refills: 3 | Status: SHIPPED | OUTPATIENT
Start: 2023-12-01 | End: 2024-03-18

## 2023-12-01 RX ORDER — ATORVASTATIN CALCIUM 40 MG/1
40 TABLET, FILM COATED ORAL DAILY
Qty: 90 TABLET | Refills: 0 | Status: SHIPPED | OUTPATIENT
Start: 2023-12-01 | End: 2024-06-03

## 2023-12-01 RX ORDER — DICLOFENAC SODIUM 30 MG/G
1 GEL TOPICAL 2 TIMES DAILY PRN
Qty: 100 G | Refills: 2 | Status: SHIPPED | OUTPATIENT
Start: 2023-12-01 | End: 2023-12-04 | Stop reason: SDUPTHER

## 2023-12-01 RX ORDER — LEVOMILNACIPRAN HYDROCHLORIDE 40 MG/1
40 CAPSULE, EXTENDED RELEASE ORAL DAILY
Qty: 30 CAPSULE | Refills: 2 | Status: SHIPPED | OUTPATIENT
Start: 2023-12-01 | End: 2023-12-04 | Stop reason: SDUPTHER

## 2023-12-01 RX ORDER — HYDROXYCHLOROQUINE SULFATE 200 MG/1
200 TABLET, FILM COATED ORAL 2 TIMES DAILY
Qty: 60 TABLET | Refills: 2 | Status: SHIPPED | OUTPATIENT
Start: 2023-12-01 | End: 2024-02-29

## 2023-12-01 RX ORDER — ASPIRIN 81 MG/1
81 TABLET ORAL DAILY
Qty: 30 TABLET | Refills: 2 | Status: SHIPPED | OUTPATIENT
Start: 2023-12-01 | End: 2024-04-08

## 2023-12-01 RX ORDER — NEBIVOLOL 10 MG/1
10 TABLET ORAL DAILY
Qty: 90 TABLET | Refills: 0 | Status: SHIPPED | OUTPATIENT
Start: 2023-12-01

## 2023-12-01 RX ORDER — CHOLECALCIFEROL (VITAMIN D3) 50 MCG
2000 TABLET ORAL DAILY
Qty: 30 TABLET | Refills: 2 | Status: SHIPPED | OUTPATIENT
Start: 2023-12-01 | End: 2024-02-29

## 2023-12-01 RX ORDER — FOLIC ACID 1 MG/1
1 TABLET ORAL DAILY
Qty: 30 TABLET | Refills: 2 | Status: SHIPPED | OUTPATIENT
Start: 2023-12-01

## 2023-12-01 RX ORDER — FERROUS SULFATE 325(65) MG
65 TABLET ORAL DAILY
Qty: 30 TABLET | Refills: 2 | Status: SHIPPED | OUTPATIENT
Start: 2023-12-01 | End: 2024-02-20

## 2023-12-01 RX ORDER — DULOXETIN HYDROCHLORIDE 30 MG/1
30 CAPSULE, DELAYED RELEASE ORAL DAILY
Qty: 90 CAPSULE | Refills: 0 | Status: SHIPPED | OUTPATIENT
Start: 2023-12-01

## 2023-12-01 RX ORDER — MULTIVITAMIN
2 TABLET ORAL DAILY
Qty: 60 TABLET | Refills: 2 | Status: SHIPPED | OUTPATIENT
Start: 2023-12-01 | End: 2024-02-20

## 2023-12-01 RX ORDER — PROPRANOLOL HYDROCHLORIDE 10 MG/1
10 TABLET ORAL 2 TIMES DAILY
Qty: 60 TABLET | Refills: 2 | Status: SHIPPED | OUTPATIENT
Start: 2023-12-01 | End: 2024-03-11

## 2023-12-01 RX ORDER — ALBUTEROL SULFATE 0.83 MG/ML
2.5 SOLUTION RESPIRATORY (INHALATION) EVERY 6 HOURS PRN
Qty: 75 ML | Refills: 3 | Status: SHIPPED | OUTPATIENT
Start: 2023-12-01

## 2023-12-01 ASSESSMENT — PATIENT HEALTH QUESTIONNAIRE - PHQ9
SUM OF ALL RESPONSES TO PHQ9 QUESTIONS 1 AND 2: 0
2. FEELING DOWN, DEPRESSED OR HOPELESS: NOT AT ALL
1. LITTLE INTEREST OR PLEASURE IN DOING THINGS: NOT AT ALL

## 2023-12-01 NOTE — PROGRESS NOTES
Subjective   Patient ID: Makenna Black is a 83 y.o. female who presents for Follow-up and med refill. She is [place] speaking and the visit was conducted with the use of a . She has a history of osteoarthritis, HTN, HLD, hypothyroidism, rheumatoid arthritis, pulmonary fibrosis and nodules 2/2 rheumatoid arthritis, hypovitaminosis D, and impaired fasting glucose. She follows with pulmonolgy for her pulmonary fibrosis, has previously been prescribed inhalers for her fibrosis, but she states that they cause her to choke/cough and as a result, she uses her albuterol twice daily. We discussed the normal use of albuterol and made recommendations regarding her use of inhalers. Her pressures at home are elevated up to the 180s, but she states that the sphygmomanometer is unreliable. Previously, she had been seen by orthopedics and rheumatology, but she has not seen them recently as she does not feel that they do much to help her. Her pain is improved with acupuncture, which she has been doing for the last 3 months.     ROS negative    Objective   /86   Pulse 76   Wt 85.7 kg (189 lb)   BMI 36.91 kg/m²    Lab Results   Component Value Date    WBC 6.7 08/14/2023    HGB 14.1 08/14/2023    HCT 44.7 08/14/2023    MCV 94 08/14/2023     08/14/2023      Lab Results   Component Value Date    GLUCOSE 96 08/14/2023    CALCIUM 9.9 08/14/2023     08/14/2023    K 4.4 08/14/2023    CO2 29 08/14/2023     08/14/2023    BUN 10 08/14/2023    CREATININE 0.68 08/14/2023      Physical Exam  Constitutional:       Appearance: Normal appearance. She is obese.   HENT:      Head: Normocephalic and atraumatic.      Nose: Nose normal.      Mouth/Throat:      Mouth: Mucous membranes are moist.      Pharynx: Oropharynx is clear.   Eyes:      Extraocular Movements: Extraocular movements intact.      Conjunctiva/sclera: Conjunctivae normal.      Pupils: Pupils are equal, round, and reactive to light.   Cardiovascular:       Rate and Rhythm: Normal rate and regular rhythm.      Pulses: Normal pulses.      Heart sounds: Normal heart sounds. No murmur heard.     No friction rub. No gallop.   Pulmonary:      Effort: Pulmonary effort is normal.      Breath sounds: Normal breath sounds. No wheezing, rhonchi or rales.   Abdominal:      General: Abdomen is flat. Bowel sounds are normal. There is no distension.      Palpations: Abdomen is soft.      Tenderness: There is no abdominal tenderness. There is no guarding or rebound.   Musculoskeletal:         General: Normal range of motion.      Cervical back: Normal range of motion.      Right lower leg: Edema present.      Left lower leg: Edema present.      Comments: 1+ pitting edema bilaterally   Skin:     General: Skin is warm and dry.      Capillary Refill: Capillary refill takes less than 2 seconds.   Neurological:      General: No focal deficit present.      Mental Status: She is alert and oriented to person, place, and time.       Assessment/Plan   Problem List Items Addressed This Visit       Chronic obstructive pulmonary disease (CMS/HCC)    Essential hypertension - Primary    Essential tremor    Hyperlipidemia    Hypothyroidism    IFG (impaired fasting glucose)    Primary osteoarthritis of both knees    Rheumatoid arthritis (CMS/HCC)     Other Visit Diagnoses       Routine general medical examination at a health care facility              Makenna Black is a 83 y.o. female who presents for Follow-up and med refill. She is Chinese speaking and the visit was conducted with the use of a . She has a history of osteoarthritis, HTN, benign essential tremors, major depression, HLD, hypothyroidism, rheumatoid arthritis, pulmonary fibrosis and nodules 2/2 rheumatoid arthritis, hypovitaminosis D, and impaired fasting glucose. At this time, she is not due for any routine lab work. Her pressure was elevated, so we asked her to do a blood pressure log at home.    # HTN  - Nebivolol 10mg  -  Pressure log    # HLD  - Atorvastatin 40mg    # Benign essential tremors  - Continue propranolol 10mg    # Major depressive disorder  - Continue Fetzima 40mg  - Continue Duloxetine 30mg    # Hypothyroidism  - Continue levothyroxine 100ug    # Rheumatoid arthritis  - Hydroxychloroquine 200mg  - Methotrexate 2.5mg  - Diclofenac 3% gel    # Pulmonary fibrosis  - Albuterol 90ug q4hr PRN  - Fluticasone propion-salmeterol 250-50ug inhaled 1 puff daily  - Continue to follow with pulmonology    # Routine health screening  - Mammogram due 3/2024  - CMP, UA, urine albumin, and lipids due 3/2024  - Vitiamin D, CBC, and A1c due 8/2024  - Colon cancer screening due, last screen unclear

## 2023-12-01 NOTE — PROGRESS NOTES
I saw and evaluated the patient. I personally obtained the key and critical portions of the history and physical exam or was physically present for key and critical portions performed by the resident/fellow. I reviewed the resident/fellow's documentation and discussed the patient with the resident/fellow. I agree with the resident/fellow's medical decision making as documented in the note.    Ana M Gongora MD

## 2023-12-01 NOTE — PATIENT INSTRUCTIONS
It was good to see you in clinic again. We will not be changing any of your medications at this time and you do not need any routine laboratory tests at this visit. It is important to take your medications as directed, even if you are feeling well, because they are helping your body to maintain health and keeping things from progressing to more severe illness. We are always happy to talk with you about options and difficulties, please call us if there's anything we can do for you.

## 2023-12-04 DIAGNOSIS — F32.4 MAJOR DEPRESSIVE DISORDER IN PARTIAL REMISSION, UNSPECIFIED WHETHER RECURRENT (CMS-HCC): ICD-10-CM

## 2023-12-04 DIAGNOSIS — M17.9 OSTEOARTHRITIS OF KNEE, UNSPECIFIED LATERALITY, UNSPECIFIED OSTEOARTHRITIS TYPE: ICD-10-CM

## 2023-12-04 RX ORDER — DICLOFENAC SODIUM 30 MG/G
1 GEL TOPICAL 2 TIMES DAILY PRN
Qty: 100 G | Refills: 2 | Status: SHIPPED | OUTPATIENT
Start: 2023-12-04 | End: 2024-03-19 | Stop reason: SDUPTHER

## 2024-01-15 ENCOUNTER — TELEMEDICINE (OUTPATIENT)
Dept: PRIMARY CARE | Facility: CLINIC | Age: 84
End: 2024-01-15
Payer: COMMERCIAL

## 2024-01-15 DIAGNOSIS — E06.3 HYPOTHYROIDISM DUE TO HASHIMOTO'S THYROIDITIS: ICD-10-CM

## 2024-01-15 DIAGNOSIS — U07.1 COVID-19: Primary | ICD-10-CM

## 2024-01-15 DIAGNOSIS — E03.8 HYPOTHYROIDISM DUE TO HASHIMOTO'S THYROIDITIS: ICD-10-CM

## 2024-01-15 PROCEDURE — 99443 PR PHYS/QHP TELEPHONE EVALUATION 21-30 MIN: CPT | Performed by: INTERNAL MEDICINE

## 2024-01-15 RX ORDER — LEVOTHYROXINE SODIUM 100 UG/1
100 TABLET ORAL DAILY
Qty: 90 TABLET | Refills: 0 | Status: SHIPPED | OUTPATIENT
Start: 2024-01-15 | End: 2024-04-16 | Stop reason: SDUPTHER

## 2024-01-15 RX ORDER — NIRMATRELVIR AND RITONAVIR 300-100 MG
3 KIT ORAL 2 TIMES DAILY
Qty: 30 TABLET | Refills: 0 | Status: SHIPPED | OUTPATIENT
Start: 2024-01-15 | End: 2024-01-20

## 2024-01-15 ASSESSMENT — ENCOUNTER SYMPTOMS
SINUS PRESSURE: 1
FEVER: 1

## 2024-01-23 DIAGNOSIS — D64.9 ANEMIA, UNSPECIFIED TYPE: ICD-10-CM

## 2024-01-23 RX ORDER — MULTIVIT/IRON SULF/FOLIC ACID 15MG-0.4MG
1 TABLET ORAL DAILY
Qty: 30 TABLET | Refills: 2 | Status: SHIPPED | OUTPATIENT
Start: 2024-01-23 | End: 2024-02-01

## 2024-01-31 ENCOUNTER — OFFICE VISIT (OUTPATIENT)
Dept: PRIMARY CARE | Facility: CLINIC | Age: 84
End: 2024-01-31
Payer: COMMERCIAL

## 2024-01-31 ENCOUNTER — LAB (OUTPATIENT)
Dept: LAB | Facility: LAB | Age: 84
End: 2024-01-31
Payer: COMMERCIAL

## 2024-01-31 VITALS — SYSTOLIC BLOOD PRESSURE: 132 MMHG | DIASTOLIC BLOOD PRESSURE: 80 MMHG | WEIGHT: 189 LBS | BODY MASS INDEX: 36.91 KG/M2

## 2024-01-31 DIAGNOSIS — E06.3 HYPOTHYROIDISM DUE TO HASHIMOTO'S THYROIDITIS: ICD-10-CM

## 2024-01-31 DIAGNOSIS — R73.01 IFG (IMPAIRED FASTING GLUCOSE): ICD-10-CM

## 2024-01-31 DIAGNOSIS — E55.9 HYPOVITAMINOSIS D: ICD-10-CM

## 2024-01-31 DIAGNOSIS — E03.8 HYPOTHYROIDISM DUE TO HASHIMOTO'S THYROIDITIS: ICD-10-CM

## 2024-01-31 DIAGNOSIS — R10.9 FLANK PAIN: ICD-10-CM

## 2024-01-31 DIAGNOSIS — I10 ESSENTIAL HYPERTENSION: Primary | ICD-10-CM

## 2024-01-31 DIAGNOSIS — I10 ESSENTIAL HYPERTENSION: ICD-10-CM

## 2024-01-31 DIAGNOSIS — R05.9 COUGH, UNSPECIFIED TYPE: ICD-10-CM

## 2024-01-31 LAB
25(OH)D3 SERPL-MCNC: 75 NG/ML (ref 30–100)
ALBUMIN SERPL BCP-MCNC: 4.2 G/DL (ref 3.4–5)
ALP SERPL-CCNC: 47 U/L (ref 33–136)
ALT SERPL W P-5'-P-CCNC: 26 U/L (ref 7–45)
ANION GAP SERPL CALC-SCNC: 14 MMOL/L (ref 10–20)
AST SERPL W P-5'-P-CCNC: 19 U/L (ref 9–39)
BILIRUB SERPL-MCNC: 0.3 MG/DL (ref 0–1.2)
BUN SERPL-MCNC: 10 MG/DL (ref 6–23)
CALCIUM SERPL-MCNC: 9.6 MG/DL (ref 8.6–10.6)
CHLORIDE SERPL-SCNC: 104 MMOL/L (ref 98–107)
CHOLEST SERPL-MCNC: 316 MG/DL (ref 0–199)
CHOLESTEROL/HDL RATIO: 4.4
CO2 SERPL-SCNC: 28 MMOL/L (ref 21–32)
CREAT SERPL-MCNC: 0.58 MG/DL (ref 0.5–1.05)
EGFRCR SERPLBLD CKD-EPI 2021: 90 ML/MIN/1.73M*2
ERYTHROCYTE [DISTWIDTH] IN BLOOD BY AUTOMATED COUNT: 13.4 % (ref 11.5–14.5)
GLUCOSE SERPL-MCNC: 88 MG/DL (ref 74–99)
HCT VFR BLD AUTO: 43.5 % (ref 36–46)
HDLC SERPL-MCNC: 71.4 MG/DL
HGB BLD-MCNC: 13.9 G/DL (ref 12–16)
LDLC SERPL CALC-MCNC: 211 MG/DL
MCH RBC QN AUTO: 29.5 PG (ref 26–34)
MCHC RBC AUTO-ENTMCNC: 32 G/DL (ref 32–36)
MCV RBC AUTO: 92 FL (ref 80–100)
NON HDL CHOLESTEROL: 245 MG/DL (ref 0–149)
NRBC BLD-RTO: 0 /100 WBCS (ref 0–0)
PLATELET # BLD AUTO: 327 X10*3/UL (ref 150–450)
POTASSIUM SERPL-SCNC: 4.3 MMOL/L (ref 3.5–5.3)
PROT SERPL-MCNC: 6.9 G/DL (ref 6.4–8.2)
RBC # BLD AUTO: 4.71 X10*6/UL (ref 4–5.2)
SODIUM SERPL-SCNC: 142 MMOL/L (ref 136–145)
TRIGL SERPL-MCNC: 166 MG/DL (ref 0–149)
TSH SERPL-ACNC: 0.59 MIU/L (ref 0.44–3.98)
VLDL: 33 MG/DL (ref 0–40)
WBC # BLD AUTO: 7.9 X10*3/UL (ref 4.4–11.3)

## 2024-01-31 PROCEDURE — 99214 OFFICE O/P EST MOD 30 MIN: CPT | Performed by: INTERNAL MEDICINE

## 2024-01-31 PROCEDURE — 84443 ASSAY THYROID STIM HORMONE: CPT

## 2024-01-31 PROCEDURE — 83036 HEMOGLOBIN GLYCOSYLATED A1C: CPT

## 2024-01-31 PROCEDURE — 85027 COMPLETE CBC AUTOMATED: CPT

## 2024-01-31 PROCEDURE — 80061 LIPID PANEL: CPT

## 2024-01-31 PROCEDURE — 36415 COLL VENOUS BLD VENIPUNCTURE: CPT

## 2024-01-31 PROCEDURE — 3079F DIAST BP 80-89 MM HG: CPT | Performed by: INTERNAL MEDICINE

## 2024-01-31 PROCEDURE — 80053 COMPREHEN METABOLIC PANEL: CPT

## 2024-01-31 PROCEDURE — 82306 VITAMIN D 25 HYDROXY: CPT

## 2024-01-31 PROCEDURE — 3075F SYST BP GE 130 - 139MM HG: CPT | Performed by: INTERNAL MEDICINE

## 2024-01-31 PROCEDURE — 81003 URINALYSIS AUTO W/O SCOPE: CPT

## 2024-01-31 RX ORDER — LORATADINE 10 MG/1
10 TABLET ORAL DAILY
Qty: 30 TABLET | Refills: 0 | Status: SHIPPED | OUTPATIENT
Start: 2024-01-31 | End: 2024-02-27

## 2024-01-31 ASSESSMENT — PATIENT HEALTH QUESTIONNAIRE - PHQ9
2. FEELING DOWN, DEPRESSED OR HOPELESS: NOT AT ALL
1. LITTLE INTEREST OR PLEASURE IN DOING THINGS: NOT AT ALL
SUM OF ALL RESPONSES TO PHQ9 QUESTIONS 1 AND 2: 0

## 2024-01-31 NOTE — PROGRESS NOTES
Chief Complaint:   Chief Complaint   Patient presents with    Ear Fullness    Back Pain      HPI    Makenna Black is a 83 y.o. year old female who presents to the clinic for follow up from COVID.  Has been having severe fatigue  Has bilateral ear fullness, inability to hear well  Has bilateral flank pain for the past few days      Past Medical History   Past Medical History:   Diagnosis Date    Body mass index (BMI) 34.0-34.9, adult 08/02/2019    Body mass index (BMI) of 34.0 to 34.9 in adult    Body mass index (BMI) 36.0-36.9, adult 10/05/2021    BMI 36.0-36.9,adult    Body mass index (BMI) 37.0-37.9, adult 07/06/2021    Body mass index (BMI) of 37.0 to 37.9 in adult    Body mass index (BMI) 37.0-37.9, adult 01/04/2022    Body mass index (BMI) of 37.0 to 37.9 in adult    Encounter for screening mammogram for malignant neoplasm of breast     Visit for screening mammogram    Morbid (severe) obesity due to excess calories (CMS/[facility]) 01/04/2022    Class 2 severe obesity with serious comorbidity and body mass index (BMI) of 37.0 to 37.9 in adult, unspecified obesity type    Morbid (severe) obesity due to excess calories (CMS/[facility]) 10/05/2021    Class 2 severe obesity with serious comorbidity and body mass index (BMI) of 36.0 to 36.9 in adult, unspecified obesity type    Personal history of other diseases of the digestive system     History of cholelithiasis      Past Surgical History:   No past surgical history on file.  Family History:   No family history on file.  Social History:   Tobacco Use: Low Risk  (12/1/2023)    Patient History     Smoking Tobacco Use: Never     Smokeless Tobacco Use: Never     Passive Exposure: Not on file      Social History     Substance and Sexual Activity   Alcohol Use Not Currently        Allergies:   No Known Allergies     ROS   Review of Systems     Objective   Vitals:    01/31/24 1352   BP: 132/80      BMI Readings from Last 15 Encounters:   01/31/24 36.91 kg/m²   12/01/23 36.91 kg/m²  "  08/25/23 36.33 kg/m²   08/14/23 36.33 kg/m²   01/17/23 37.69 kg/m²   10/11/22 36.13 kg/m²   08/30/22 35.94 kg/m²   07/12/22 35.94 kg/m²   05/19/22 37.89 kg/m²      85.7 kg (189 lb) (1/31/2024  1:52 PM)      Physical Exam  Physical Exam   Diminished sounds at bases  Normal ear exam  No CVA tenderness  RR, no gallops      Labs:  Lab Results   Component Value Date    WBC 6.7 08/14/2023    HGB 14.1 08/14/2023    HCT 44.7 08/14/2023    MCV 94 08/14/2023     08/14/2023     Lab Results   Component Value Date    GLUCOSE 96 08/14/2023    CALCIUM 9.9 08/14/2023     08/14/2023    K 4.4 08/14/2023    CO2 29 08/14/2023     08/14/2023    BUN 10 08/14/2023    CREATININE 0.68 08/14/2023         No components found for: \"URINE ALBUMIN\"  Lab Results   Component Value Date    HGBA1C 5.4 08/14/2023      Lab Results   Component Value Date    HGBA1C 5.4 08/14/2023    HGBA1C 5.6 01/17/2023    HGBA1C 5.4 10/11/2022     Lab Results   Component Value Date    TSH 0.50 08/14/2023       Current Medications:  Current Outpatient Medications   Medication Sig Dispense Refill    acetaminophen (Tylenol) 500 mg tablet Take 1 tablet (500 mg) by mouth every 6 hours if needed for mild pain (1 - 3). 30 tablet 3    acetaminophen 500 mg capsule Take 1 tablet by mouth in the morning and 1 tablet at noon and 1 tablet in the evening and 1 tablet before bedtime.      adalimumab (Humira) 40 mg/0.8 mL syringe kit prefilled syringe Inject 1 each (40 mg) under the skin.      albuterol (ProAir HFA) 90 mcg/actuation inhaler Inhale 1 puff every 4 hours if needed for wheezing. 18 g 3    albuterol 2.5 mg /3 mL (0.083 %) nebulizer solution Take 3 mL (2.5 mg) by nebulization every 6 hours if needed for shortness of breath. 75 mL 3    ammonium lactate (Amlactin) 12 % cream 1 Application.      aspirin 81 mg EC tablet Take 1 tablet (81 mg) by mouth once daily. with food 30 tablet 2    atorvastatin (Lipitor) 40 mg tablet Take 1 tablet (40 mg) by mouth " once daily. 90 tablet 0    calcium carbonate-vitamin D3 600 mg-10 mcg (400 unit) tablet Take 2 tablets by mouth once daily. 60 tablet 2    calcium carbonate-vitamin D3 600 mg-5 mcg (200 unit) tablet Take 1 tablet by mouth in the morning and 1 tablet before bedtime.      cholecalciferol (Vitamin D-3) 50 mcg (2,000 unit) capsule TAKE ONE CAPSULE DAILY 30 capsule 2    cholecalciferol (Vitamin D-3) 50 MCG (2000 UT) tablet Take 1 tablet (2,000 Units) by mouth once daily. 30 tablet 2    ciprofloxacin (Cetraxal) 0.2 % otic solution Administer 0.25 mL into affected ear(s) in the morning and 0.25 mL at noon and 0.25 mL in the evening and 0.25 mL before bedtime.      diclofenac (Voltaren) 75 mg EC tablet Take 1 tablet (75 mg) by mouth in the morning and 1 tablet (75 mg) before bedtime.      diclofenac sodium 1 % kit Place 1 each on the skin.      diclofenac sodium 3 % gel Apply 1 Application topically 2 times a day as needed (Pain). 100 g 2    docusate sodium (Colace) 100 mg capsule Take 1 capsule (100 mg) by mouth in the morning and 1 capsule (100 mg) before bedtime.      DULoxetine (Cymbalta) 30 mg DR capsule Take 1 capsule (30 mg) by mouth once daily. 90 capsule 0    ferrous sulfate, 325 mg ferrous sulfate, tablet Take 1 tablet by mouth once daily. 30 tablet 2    fluticasone (Flonase) 50 mcg/actuation nasal spray Administer 1 spray into each nostril once daily.      fluticasone propion-salmeteroL (Advair Diskus) 250-50 mcg/dose diskus inhaler Inhale 1 puff.      folic acid (Folvite) 1 mg tablet Take 1 tablet (1 mg) by mouth once daily. 30 tablet 2    glucosamine armas 2KCl-chondroit 750-600 mg tablet Take 1 each by mouth.      hydroxychloroquine (Plaquenil) 200 mg tablet Take 1 tablet (200 mg) by mouth 2 times a day. 60 tablet 2    ipratropium (Atrovent HFA) 17 mcg/actuation inhaler Inhale 1 puff  in the morning and 1 puff at noon and 1 puff in the evening and 1 puff before bedtime.      ketorolac (Acular) 0.5 % ophthalmic  solution Administer into both eyes 4 times a day.      levomilnacipranR (Fetzima) 40 mg extended release capsule Take 1 capsule (40 mg) by mouth once daily. 30 capsule 2    levothyroxine (Synthroid, Levoxyl) 100 mcg tablet Take 1 tablet (100 mcg) by mouth once daily. 90 tablet 0    loratadine (Claritin) 10 mg tablet Take 1 tablet (10 mg) by mouth once daily. 30 tablet 0    meclizine (Antivert) 25 mg tablet TAKE 1 TABLET 3 TIMES DAILY AS NEEDED FOR DIZZINESS 90 tablet 0    melatonin 5 mg tablet Take 1 tablet (5 mg) by mouth once daily at bedtime.      methotrexate (Trexall) 2.5 mg tablet Take 1 tablet (2.5 mg total) by mouth 1 (one) time per week.      nebivolol (Bystolic) 10 mg tablet Take 1 tablet (10 mg) by mouth once daily. 90 tablet 0    olopatadine (Patanol) 0.1 % ophthalmic solution Administer 1 drop into both eyes in the morning and 1 drop before bedtime.      omeprazole (PriLOSEC) 20 mg DR capsule Take 1 capsule (20 mg) by mouth once daily.      polyvinyl alcohol (Liquifilm Tears) 1.4 % ophthalmic solution Administer 1 drop into both eyes if needed.      predniSONE (Deltasone) 10 mg tablet Take 1 tablet (10 mg) by mouth once daily.      predniSONE (Deltasone) 20 mg tablet Take 1 tablet (20 mg) by mouth once daily.      propranolol (Inderal) 10 mg tablet Take 1 tablet (10 mg) by mouth 2 times a day. 60 tablet 2    Tab-A-Christel Multivitamin w-iron 15 mg iron- 400 mcg tablet TAKE 1 TABLET BY MOUTH ONCE DAILY. 30 tablet 2    VITAMIN A ORAL Take 1 tablet by mouth once daily.       No current facility-administered medications for this visit.       Assessment and Plan  Makenna was seen today for ear fullness and back pain.  Diagnoses and all orders for this visit:  Essential hypertension (Primary)  -     Urinalysis with Reflex Microscopic; Future  -     CBC; Future  -     Comprehensive Metabolic Panel; Future  -     TSH with reflex to Free T4 if abnormal; Future  -     Hemoglobin A1C; Future  -     Lipid Panel;  Future  -     Vitamin D 25-Hydroxy,Total (for eval of Vitamin D levels); Future  Hypothyroidism due to Hashimoto's thyroiditis  -     Urinalysis with Reflex Microscopic; Future  -     CBC; Future  -     Comprehensive Metabolic Panel; Future  -     TSH with reflex to Free T4 if abnormal; Future  -     Hemoglobin A1C; Future  -     Lipid Panel; Future  -     Vitamin D 25-Hydroxy,Total (for eval of Vitamin D levels); Future  Flank pain  -     Urinalysis with Reflex Microscopic; Future  -     CBC; Future  -     Comprehensive Metabolic Panel; Future  -     TSH with reflex to Free T4 if abnormal; Future  -     Hemoglobin A1C; Future  -     Lipid Panel; Future  -     Vitamin D 25-Hydroxy,Total (for eval of Vitamin D levels); Future  IFG (impaired fasting glucose)  -     Hemoglobin A1C; Future  Hypovitaminosis D  -     Vitamin D 25-Hydroxy,Total (for eval of Vitamin D levels); Future  Cough, unspecified type  -     XR chest 2 views; Future  -     loratadine (Claritin) 10 mg tablet; Take 1 tablet (10 mg) by mouth once daily.   Recommend Cxray   Recommend complete BW< including CMP and UA  Recommend Claritin 10 mg daily   Further treatment pending results      Immunizations:  Immunization History   Administered Date(s) Administered    Influenza, High Dose Seasonal, Preservative Free 11/28/2016, 11/07/2017, 08/28/2018, 09/01/2020    Influenza, Unspecified 11/08/2011    Influenza, seasonal, injectable 09/24/2013    Pfizer Purple Cap SARS-CoV-2 11/24/2021, 12/17/2021    Pneumococcal conjugate vaccine, 13-valent (PREVNAR 13) 04/05/2017    Pneumococcal polysaccharide vaccine, 23-valent, age 2 years and older (PNEUMOVAX 23) 01/15/2018    Pneumococcal, Unspecified 11/16/2016     Please follow up in

## 2024-02-01 ENCOUNTER — HOSPITAL ENCOUNTER (OUTPATIENT)
Dept: RADIOLOGY | Facility: CLINIC | Age: 84
Discharge: HOME | End: 2024-02-01
Payer: COMMERCIAL

## 2024-02-01 DIAGNOSIS — R05.9 COUGH, UNSPECIFIED TYPE: ICD-10-CM

## 2024-02-01 DIAGNOSIS — D64.9 ANEMIA, UNSPECIFIED TYPE: ICD-10-CM

## 2024-02-01 LAB
APPEARANCE UR: CLEAR
BILIRUB UR STRIP.AUTO-MCNC: NEGATIVE MG/DL
COLOR UR: YELLOW
EST. AVERAGE GLUCOSE BLD GHB EST-MCNC: 117 MG/DL
GLUCOSE UR STRIP.AUTO-MCNC: NEGATIVE MG/DL
HBA1C MFR BLD: 5.7 %
KETONES UR STRIP.AUTO-MCNC: NEGATIVE MG/DL
LEUKOCYTE ESTERASE UR QL STRIP.AUTO: NEGATIVE
NITRITE UR QL STRIP.AUTO: NEGATIVE
PH UR STRIP.AUTO: 7 [PH]
PROT UR STRIP.AUTO-MCNC: NEGATIVE MG/DL
RBC # UR STRIP.AUTO: NEGATIVE /UL
SP GR UR STRIP.AUTO: 1.01
UROBILINOGEN UR STRIP.AUTO-MCNC: <2 MG/DL

## 2024-02-01 PROCEDURE — 71046 X-RAY EXAM CHEST 2 VIEWS: CPT

## 2024-02-01 PROCEDURE — 71046 X-RAY EXAM CHEST 2 VIEWS: CPT | Performed by: RADIOLOGY

## 2024-02-01 RX ORDER — MULTIVIT/IRON SULF/FOLIC ACID 15MG-0.4MG
1 TABLET ORAL DAILY
Qty: 30 TABLET | Refills: 2 | Status: SHIPPED | OUTPATIENT
Start: 2024-02-01 | End: 2024-05-07

## 2024-02-01 NOTE — RESULT ENCOUNTER NOTE
Discussed results with the patient.  She has not been taking her cholesterol meds.  Told her to start taking it.

## 2024-02-03 NOTE — RESULT ENCOUNTER NOTE
I reviewed your results.  Everything looks good.  Please continue with current treatment.  Best,  Dr. Viramontes

## 2024-02-20 DIAGNOSIS — E55.9 VITAMIN D DEFICIENCY: ICD-10-CM

## 2024-02-20 DIAGNOSIS — E61.1 IRON DEFICIENCY: ICD-10-CM

## 2024-02-20 DIAGNOSIS — R42 VERTIGO: ICD-10-CM

## 2024-02-20 RX ORDER — MULTIVITAMIN
2 TABLET ORAL DAILY
Qty: 60 TABLET | Refills: 2 | Status: SHIPPED | OUTPATIENT
Start: 2024-02-20 | End: 2024-02-27

## 2024-02-20 RX ORDER — FERROUS SULFATE TAB 325 MG (65 MG ELEMENTAL FE) 325 (65 FE) MG
1 TAB ORAL DAILY
Qty: 30 TABLET | Refills: 2 | Status: SHIPPED | OUTPATIENT
Start: 2024-02-20

## 2024-02-27 DIAGNOSIS — R05.9 COUGH, UNSPECIFIED TYPE: ICD-10-CM

## 2024-02-27 DIAGNOSIS — E55.9 VITAMIN D DEFICIENCY: ICD-10-CM

## 2024-02-27 DIAGNOSIS — R42 VERTIGO: ICD-10-CM

## 2024-02-27 RX ORDER — LORATADINE 10 MG/1
10 TABLET ORAL DAILY
Qty: 30 TABLET | Refills: 0 | Status: SHIPPED | OUTPATIENT
Start: 2024-02-27 | End: 2024-03-26

## 2024-02-27 RX ORDER — MULTIVITAMIN
2 TABLET ORAL DAILY
Qty: 60 TABLET | Refills: 2 | Status: SHIPPED | OUTPATIENT
Start: 2024-02-27 | End: 2024-06-03

## 2024-03-11 DIAGNOSIS — G25.0 ESSENTIAL TREMOR: ICD-10-CM

## 2024-03-11 RX ORDER — PROPRANOLOL HYDROCHLORIDE 10 MG/1
10 TABLET ORAL 2 TIMES DAILY
Qty: 60 TABLET | Refills: 2 | Status: SHIPPED | OUTPATIENT
Start: 2024-03-11 | End: 2024-06-03

## 2024-03-18 DIAGNOSIS — J84.10 PULMONARY FIBROSIS (MULTI): ICD-10-CM

## 2024-03-18 RX ORDER — ALBUTEROL SULFATE 90 UG/1
1 AEROSOL, METERED RESPIRATORY (INHALATION) EVERY 4 HOURS PRN
Qty: 8.5 G | Refills: 3 | Status: SHIPPED | OUTPATIENT
Start: 2024-03-18 | End: 2024-04-16 | Stop reason: SDUPTHER

## 2024-03-19 ENCOUNTER — APPOINTMENT (OUTPATIENT)
Dept: PRIMARY CARE | Facility: CLINIC | Age: 84
End: 2024-03-19
Payer: COMMERCIAL

## 2024-03-19 ENCOUNTER — OFFICE VISIT (OUTPATIENT)
Dept: PRIMARY CARE | Facility: CLINIC | Age: 84
End: 2024-03-19
Payer: COMMERCIAL

## 2024-03-19 VITALS — DIASTOLIC BLOOD PRESSURE: 90 MMHG | BODY MASS INDEX: 36.52 KG/M2 | WEIGHT: 187 LBS | SYSTOLIC BLOOD PRESSURE: 124 MMHG

## 2024-03-19 DIAGNOSIS — M06.9 RHEUMATOID ARTHRITIS, INVOLVING UNSPECIFIED SITE, UNSPECIFIED WHETHER RHEUMATOID FACTOR PRESENT (MULTI): ICD-10-CM

## 2024-03-19 DIAGNOSIS — Z00.00 ROUTINE GENERAL MEDICAL EXAMINATION AT HEALTH CARE FACILITY: Primary | ICD-10-CM

## 2024-03-19 DIAGNOSIS — F33.1 MAJOR DEPRESSIVE DISORDER, RECURRENT, MODERATE (MULTI): ICD-10-CM

## 2024-03-19 DIAGNOSIS — E66.01 OBESITY, MORBID (MULTI): ICD-10-CM

## 2024-03-19 DIAGNOSIS — F51.01 PRIMARY INSOMNIA: ICD-10-CM

## 2024-03-19 DIAGNOSIS — R91.8 LUNG NODULES: ICD-10-CM

## 2024-03-19 DIAGNOSIS — R25.1 TREMOR: ICD-10-CM

## 2024-03-19 DIAGNOSIS — I27.21 PULMONARY ARTERIAL HYPERTENSION (MULTI): ICD-10-CM

## 2024-03-19 DIAGNOSIS — M17.9 OSTEOARTHRITIS OF KNEE, UNSPECIFIED LATERALITY, UNSPECIFIED OSTEOARTHRITIS TYPE: ICD-10-CM

## 2024-03-19 DIAGNOSIS — J84.10 PULMONARY FIBROSIS (MULTI): ICD-10-CM

## 2024-03-19 DIAGNOSIS — F32.4 MAJOR DEPRESSIVE DISORDER IN PARTIAL REMISSION, UNSPECIFIED WHETHER RECURRENT (CMS-HCC): ICD-10-CM

## 2024-03-19 DIAGNOSIS — J44.9 CHRONIC OBSTRUCTIVE PULMONARY DISEASE, UNSPECIFIED COPD TYPE (MULTI): ICD-10-CM

## 2024-03-19 DIAGNOSIS — R42 VERTIGO: ICD-10-CM

## 2024-03-19 PROCEDURE — 1124F ACP DISCUSS-NO DSCNMKR DOCD: CPT | Performed by: INTERNAL MEDICINE

## 2024-03-19 PROCEDURE — 3080F DIAST BP >= 90 MM HG: CPT | Performed by: INTERNAL MEDICINE

## 2024-03-19 PROCEDURE — 3074F SYST BP LT 130 MM HG: CPT | Performed by: INTERNAL MEDICINE

## 2024-03-19 PROCEDURE — 1160F RVW MEDS BY RX/DR IN RCRD: CPT | Performed by: INTERNAL MEDICINE

## 2024-03-19 PROCEDURE — 1036F TOBACCO NON-USER: CPT | Performed by: INTERNAL MEDICINE

## 2024-03-19 PROCEDURE — 1170F FXNL STATUS ASSESSED: CPT | Performed by: INTERNAL MEDICINE

## 2024-03-19 PROCEDURE — G0439 PPPS, SUBSEQ VISIT: HCPCS | Performed by: INTERNAL MEDICINE

## 2024-03-19 PROCEDURE — 99214 OFFICE O/P EST MOD 30 MIN: CPT | Performed by: INTERNAL MEDICINE

## 2024-03-19 PROCEDURE — 1159F MED LIST DOCD IN RCRD: CPT | Performed by: INTERNAL MEDICINE

## 2024-03-19 RX ORDER — ACETAMINOPHEN 500 MG
5 TABLET ORAL NIGHTLY
Qty: 30 TABLET | Refills: 3 | Status: SHIPPED | OUTPATIENT
Start: 2024-03-19 | End: 2024-07-17

## 2024-03-19 RX ORDER — DICLOFENAC SODIUM 30 MG/G
1 GEL TOPICAL 2 TIMES DAILY PRN
Qty: 100 G | Refills: 2 | Status: SHIPPED | OUTPATIENT
Start: 2024-03-19

## 2024-03-19 RX ORDER — DICLOFENAC SODIUM 75 MG/1
75 TABLET, DELAYED RELEASE ORAL 2 TIMES DAILY
Qty: 60 TABLET | Refills: 0 | Status: SHIPPED | OUTPATIENT
Start: 2024-03-19 | End: 2024-04-18

## 2024-03-19 ASSESSMENT — ENCOUNTER SYMPTOMS
RESPIRATORY NEGATIVE: 1
GASTROINTESTINAL NEGATIVE: 1
CONSTITUTIONAL NEGATIVE: 1
DIZZINESS: 1
ARTHRALGIAS: 1
TREMORS: 1
NUMBNESS: 0
LOSS OF SENSATION IN FEET: 0
JOINT SWELLING: 0
HEADACHES: 0
SPEECH DIFFICULTY: 0
CARDIOVASCULAR NEGATIVE: 1
DEPRESSION: 0
OCCASIONAL FEELINGS OF UNSTEADINESS: 1

## 2024-03-19 ASSESSMENT — ACTIVITIES OF DAILY LIVING (ADL)
GROCERY_SHOPPING: INDEPENDENT
DRESSING: INDEPENDENT
BATHING: INDEPENDENT
MANAGING_FINANCES: INDEPENDENT
DOING_HOUSEWORK: INDEPENDENT
TAKING_MEDICATION: INDEPENDENT

## 2024-03-19 ASSESSMENT — PATIENT HEALTH QUESTIONNAIRE - PHQ9
SUM OF ALL RESPONSES TO PHQ9 QUESTIONS 1 AND 2: 0
1. LITTLE INTEREST OR PLEASURE IN DOING THINGS: NOT AT ALL
2. FEELING DOWN, DEPRESSED OR HOPELESS: NOT AT ALL
SUM OF ALL RESPONSES TO PHQ9 QUESTIONS 1 AND 2: 0
1. LITTLE INTEREST OR PLEASURE IN DOING THINGS: NOT AT ALL
2. FEELING DOWN, DEPRESSED OR HOPELESS: NOT AT ALL

## 2024-03-19 NOTE — PROGRESS NOTES
Subjective   Reason for Visit: Makenna Black is an 83 y.o. female here for a Medicare Wellness visit. She is [place] speaking and the visit was conducted with the use of a . She has a history of osteoarthritis, HTN, HLD, hypothyroidism, rheumatoid arthritis, pulmonary fibrosis and nodules 2/2 rheumatoid arthritis, hypovitaminosis D, and impaired fasting glucose. She follows with pulmonolgy for her pulmonary fibrosis, has previously been prescribed inhalers for her fibrosis, but she states that they cause her to choke/cough and as a result, she uses her albuterol twice daily. We discussed the normal use of albuterol and made recommendations regarding her use of inhalers.     Reviewed all medications by prescribing practitioner or clinical pharmacist (such as prescriptions, OTCs, herbal therapies and supplements) and documented in the medical record.  Patient Care Team:  Ana M Gongora MD as PCP - General  Ana M Gongora MD as PCP - [employer] PCP     Review of Systems   Constitutional: Negative.    HENT: Negative.     Respiratory: Negative.     Cardiovascular: Negative.    Gastrointestinal: Negative.    Genitourinary: Negative.    Musculoskeletal:  Positive for arthralgias and gait problem. Negative for joint swelling.   Neurological:  Positive for dizziness and tremors. Negative for syncope, speech difficulty, numbness and headaches.     Medicare Wellness Billing Compliance Satisfied    *This is a visual tool to show completion of required items on the day of the visit. Green checks will only appear on the date of visit.    Review all medications by prescribing practitioner or clinical pharmacist (such as prescriptions, OTCs, herbal therapies and supplements) documented in the medical record    Past Medical, Surgical, and Family History reviewed and updated in chart    Tobacco Use Reviewed    Alcohol Use Reviewed    Illicit Drug Use Reviewed    PHQ2/9    Falls in Last Year  Reviewed    Home Safety Risk Factors Reviewed    Cognitive Impairment Reviewed    Patient Self Assessment and Health Status    Current Diet Reviewed    Exercise Frequency    ADL - Hearing Impairment    ADL - Bathing    ADL - Dressing    ADL - Walks in Home    IADL - Managing Finances    IADL - Grocery Shopping    IADL - Taking Medications    IADL - Doing Housework      Objective   Vitals:  /90   Wt 84.8 kg (187 lb)   BMI 36.52 kg/m²       Physical Exam  Constitutional:       General: She is not in acute distress.     Appearance: Normal appearance. She is obese. She is not ill-appearing.   HENT:      Head: Normocephalic and atraumatic.      Nose: Nose normal.      Mouth/Throat:      Mouth: Mucous membranes are moist.      Pharynx: Oropharynx is clear.   Cardiovascular:      Rate and Rhythm: Normal rate and regular rhythm.      Pulses: Normal pulses.      Heart sounds: Normal heart sounds. No murmur heard.     No friction rub. No gallop.   Pulmonary:      Effort: Pulmonary effort is normal. No respiratory distress.      Breath sounds: Wheezing present. No rhonchi or rales.   Abdominal:      General: Abdomen is flat. Bowel sounds are normal. There is no distension.      Palpations: Abdomen is soft.      Tenderness: There is no abdominal tenderness. There is no guarding or rebound.   Musculoskeletal:         General: Normal range of motion.      Right lower leg: No edema.      Left lower leg: No edema.   Skin:     General: Skin is warm and dry.      Capillary Refill: Capillary refill takes less than 2 seconds.   Neurological:      General: No focal deficit present.      Mental Status: She is alert and oriented to person, place, and time.   Assessment/Plan   Problem List Items Addressed This Visit       Vertigo    Insomnia    Lung nodules    Osteoarthritis of knee    Relevant Medications    diclofenac sodium 3 % gel    diclofenac (Voltaren) 75 mg EC tablet    melatonin 5 mg tablet    Rheumatoid  arthritis (CMS/HCC)    Tremor     Other Visit Diagnoses       Routine general medical examination at health care facility    -  Primary    Pulmonary fibrosis (CMS/[facility])              Makenna Black is an 83 y.o. female here for a Medicare Wellness visit. She is Sri Lankan speaking and the visit was conducted with the use of a . She has a history of osteoarthritis, HTN, HLD, hypothyroidism, rheumatoid arthritis, pulmonary fibrosis and nodules 2/2 rheumatoid arthritis, hypovitaminosis D, and impaired fasting glucose.     # HTN  -Today at the office was 124/90  - Propranolol 10mg  - Was advised to keep a log at home    # HLD  - She was not taking atorvastatin and was advised to resume 40mg as prescribed    # Pre-diabetic  - Was advised to reduce carbohydrates consumption  - Monitor A1c    #Hypothyroidism  - She takes levothyroxine 100 mcg as prescribed    #RA   #osteoarthritis  - She takes methotrexate 2.5 mg as prescribed   - She takes ibuprofen as needed was advised to stop  - Was advised to resume diclofenac sodium 75mg and start doclofenac sodium gel as needed    #Insomnia  - Melatonin 5 mg     # Pulmonary fibrosis  # Asthma  # Stable pulmonary nodules 2/2 RA  - She takes albuterol twice a day was educated on proper use  - she takes fluticasone/salmeterol  as prescribed  - she takes ipratropium ( but was not sure )    # Hearing loss  - She was not using hearing aids at the office  - She continues to use them on both sides     # Vertigo  - Meclazine    # Routine health maintenance  - Declined mammogram and flu shot  - CMP, urine albumin, and lipids due 9/2024  - Vitiamin D, CBC, and A1c due 9/2024  - Colon cancer screening due, last screen unclear discuss next visit

## 2024-03-19 NOTE — PROGRESS NOTES
I reviewed the resident/fellow's documentation and discussed the patient with the resident/fellow. I agree with the resident/fellow's medical decision making as documented in the note.  As the attending physician, I certify that I personally reviewed the patient's history and personally examined the patient to confirm the physical findings described above, and that I reviewed the relevant imaging studies and available reports. I also discussed the differential diagnosis and all of the proposed management plans with the patient and individuals accompanying the patient to this visit. They had the opportunity to ask questions about the proposed management plans and to have those questions answered.     Ana M Gongora MD

## 2024-03-26 DIAGNOSIS — R05.9 COUGH, UNSPECIFIED TYPE: ICD-10-CM

## 2024-03-26 RX ORDER — LORATADINE 10 MG/1
10 TABLET ORAL DAILY
Qty: 90 TABLET | Refills: 0 | Status: SHIPPED | OUTPATIENT
Start: 2024-03-26

## 2024-04-08 DIAGNOSIS — E55.9 VITAMIN D DEFICIENCY: ICD-10-CM

## 2024-04-08 DIAGNOSIS — R42 VERTIGO: ICD-10-CM

## 2024-04-08 RX ORDER — ASPIRIN 81 MG/1
81 TABLET ORAL DAILY
Qty: 30 TABLET | Refills: 2 | Status: SHIPPED | OUTPATIENT
Start: 2024-04-08 | End: 2024-07-07

## 2024-04-15 DIAGNOSIS — R42 VERTIGO: ICD-10-CM

## 2024-04-15 DIAGNOSIS — E55.9 VITAMIN D DEFICIENCY: ICD-10-CM

## 2024-04-15 RX ORDER — ACETAMINOPHEN 500 MG
TABLET ORAL
Qty: 90 CAPSULE | Refills: 0 | Status: SHIPPED | OUTPATIENT
Start: 2024-04-15

## 2024-04-16 ENCOUNTER — TELEMEDICINE (OUTPATIENT)
Dept: PRIMARY CARE | Facility: CLINIC | Age: 84
End: 2024-04-16
Payer: COMMERCIAL

## 2024-04-16 DIAGNOSIS — E06.3 HYPOTHYROIDISM DUE TO HASHIMOTO'S THYROIDITIS: ICD-10-CM

## 2024-04-16 DIAGNOSIS — E03.8 HYPOTHYROIDISM DUE TO HASHIMOTO'S THYROIDITIS: ICD-10-CM

## 2024-04-16 DIAGNOSIS — J40 BRONCHITIS: Primary | ICD-10-CM

## 2024-04-16 DIAGNOSIS — J84.10 PULMONARY FIBROSIS (MULTI): ICD-10-CM

## 2024-04-16 PROCEDURE — 99442 PR PHYS/QHP TELEPHONE EVALUATION 11-20 MIN: CPT | Performed by: INTERNAL MEDICINE

## 2024-04-16 PROCEDURE — 1159F MED LIST DOCD IN RCRD: CPT | Performed by: INTERNAL MEDICINE

## 2024-04-16 PROCEDURE — 1160F RVW MEDS BY RX/DR IN RCRD: CPT | Performed by: INTERNAL MEDICINE

## 2024-04-16 RX ORDER — LEVOTHYROXINE SODIUM 100 UG/1
100 TABLET ORAL DAILY
Qty: 90 TABLET | Refills: 1 | Status: SHIPPED | OUTPATIENT
Start: 2024-04-16

## 2024-04-16 RX ORDER — MINERAL OIL
180 ENEMA (ML) RECTAL DAILY
Qty: 30 TABLET | Refills: 0 | Status: SHIPPED | OUTPATIENT
Start: 2024-04-16 | End: 2024-05-13

## 2024-04-16 RX ORDER — FLUTICASONE PROPIONATE 50 MCG
1 SPRAY, SUSPENSION (ML) NASAL DAILY
Qty: 16 G | Refills: 2 | Status: SHIPPED | OUTPATIENT
Start: 2024-04-16 | End: 2025-04-16

## 2024-04-16 RX ORDER — BENZONATATE 100 MG/1
100 CAPSULE ORAL 3 TIMES DAILY PRN
Qty: 42 CAPSULE | Refills: 0 | Status: SHIPPED | OUTPATIENT
Start: 2024-04-16 | End: 2024-05-16

## 2024-04-16 RX ORDER — ALBUTEROL SULFATE 90 UG/1
1 AEROSOL, METERED RESPIRATORY (INHALATION) EVERY 4 HOURS PRN
Qty: 8.5 G | Refills: 3 | Status: SHIPPED | OUTPATIENT
Start: 2024-04-16

## 2024-04-16 ASSESSMENT — ENCOUNTER SYMPTOMS
SORE THROAT: 1
COUGH: 1
CHEST TIGHTNESS: 0
CHOKING: 0
RHINORRHEA: 1

## 2024-04-16 NOTE — PROGRESS NOTES
Chief Complaint:   Chief Complaint   Patient presents with    URI      HPI    Makenna Black is a 83 y.o. year old female who presents to the clinic for     Past Medical History   Past Medical History:   Diagnosis Date    Body mass index (BMI) 34.0-34.9, adult 08/02/2019    Body mass index (BMI) of 34.0 to 34.9 in adult    Body mass index (BMI) 36.0-36.9, adult 10/05/2021    BMI 36.0-36.9,adult    Body mass index (BMI) 37.0-37.9, adult 07/06/2021    Body mass index (BMI) of 37.0 to 37.9 in adult    Body mass index (BMI) 37.0-37.9, adult 01/04/2022    Body mass index (BMI) of 37.0 to 37.9 in adult    Encounter for screening mammogram for malignant neoplasm of breast     Visit for screening mammogram    Morbid (severe) obesity due to excess calories (Multi) 01/04/2022    Class 2 severe obesity with serious comorbidity and body mass index (BMI) of 37.0 to 37.9 in adult, unspecified obesity type    Morbid (severe) obesity due to excess calories (Multi) 10/05/2021    Class 2 severe obesity with serious comorbidity and body mass index (BMI) of 36.0 to 36.9 in adult, unspecified obesity type    Personal history of other diseases of the digestive system     History of cholelithiasis      Past Surgical History:   No past surgical history on file.  Family History:   No family history on file.  Social History:   Tobacco Use: Low Risk  (4/2/2024)    Received from [facility]    Patient History     Smoking Tobacco Use: Never     Smokeless Tobacco Use: Never     Passive Exposure: Not on file      Social History     Substance and Sexual Activity   Alcohol Use Not Currently        Allergies:   No Known Allergies     ROS   Review of Systems   HENT:  Positive for congestion, rhinorrhea and sore throat.    Respiratory:  Positive for cough. Negative for choking and chest tightness.         Objective   There were no vitals filed for this visit.   BMI Readings from Last 15 Encounters:   03/19/24 36.52 kg/m²   01/31/24 36.91 kg/m²  "  12/01/23 36.91 kg/m²   08/25/23 36.33 kg/m²   08/14/23 36.33 kg/m²   01/17/23 37.69 kg/m²   10/11/22 36.13 kg/m²   08/30/22 35.94 kg/m²   07/12/22 35.94 kg/m²   05/19/22 37.89 kg/m²      84.8 kg (187 lb) (3/19/2024 10:19 AM)      Physical Exam  Physical Exam     Labs:  CBC:  Recent Labs     01/31/24  1412 08/14/23  1047 10/11/22  1054   WBC 7.9 6.7 8.1   HGB 13.9 14.1 14.0   HCT 43.5 44.7 42.5    243 254   MCV 92 94 94     CMP:  Recent Labs     01/31/24  1412 08/14/23  1047 01/17/23  1207    143 143   K 4.3 4.4 4.6    104 106   CO2 28 29 31   ANIONGAP 14 14 11   BUN 10 10 21   CREATININE 0.58 0.68 0.64   EGFR 90  --   --    GLUCOSE 88 96 103*     Recent Labs     01/31/24  1412 08/14/23  1047 01/17/23  1207   ALBUMIN 4.2 4.4 4.4   ALKPHOS 47 43 40   ALT 26 21 28   AST 19 21 18   BILITOT 0.3 0.4 0.5     Calcium/Phos:   Lab Results   Component Value Date    CALCIUM 9.6 01/31/2024      COAG: No results for input(s): \"INR\", \"DDIMERVTE\" in the last 04599 hours.  CRP:   Lab Results   Component Value Date    CRP 1.19 (A) 01/20/2021      [unfilled]   ENDO:  Recent Labs     01/31/24  1412 08/14/23  1047 01/17/23  1207 10/11/22  1054   TSH 0.59 0.50 0.63 0.29*   HGBA1C 5.7* 5.4 5.6 5.4      CARDIAC: No results for input(s): \"LDH\", \"CKMB\", \"TROPHS\", \"BNP\" in the last 59563 hours.    No lab exists for component: \"CK\", \"CKMBP\"  Recent Labs     01/31/24  1412 08/14/23  1047 10/11/22  1054 04/08/22  1128   CHOL 316* 247* 234* 301*   LDLF  --  139* 130* 195*   LDLCALC 211  --   --   --    HDL 71.4 77.3 80.4 76.0   TRIG 166* 152* 119 148     No data recorded    Current Medications:  Current Outpatient Medications   Medication Sig Dispense Refill    acetaminophen (Tylenol) 500 mg tablet Take 1 tablet (500 mg) by mouth every 6 hours if needed for mild pain (1 - 3). 30 tablet 3    acetaminophen 500 mg capsule Take 1 tablet by mouth in the morning and 1 tablet at noon and 1 tablet in the evening and 1 tablet " before bedtime.      adalimumab (Humira) 40 mg/0.8 mL syringe kit prefilled syringe Inject 1 each (40 mg) under the skin.      albuterol 2.5 mg /3 mL (0.083 %) nebulizer solution Take 3 mL (2.5 mg) by nebulization every 6 hours if needed for shortness of breath. 75 mL 3    albuterol 90 mcg/actuation inhaler Inhale 1 puff every 4 hours if needed for wheezing. 8.5 g 3    ammonium lactate (Amlactin) 12 % cream 1 Application.      aspirin 81 mg EC tablet TAKE 1 TABLET (81 MG) BY MOUTH ONCE DAILY. WITH FOOD 30 tablet 2    atorvastatin (Lipitor) 40 mg tablet Take 1 tablet (40 mg) by mouth once daily. 90 tablet 0    benzonatate (Tessalon) 100 mg capsule Take 1 capsule (100 mg) by mouth 3 times a day as needed for cough. Do not crush or chew. 42 capsule 0    calcium carbonate-vitamin D3 600 mg-10 mcg (400 unit) tablet TAKE 2 TABLETS BY MOUTH ONCE DAILY. 60 tablet 2    calcium carbonate-vitamin D3 600 mg-5 mcg (200 unit) tablet Take 1 tablet by mouth in the morning and 1 tablet before bedtime.      cholecalciferol (Vitamin D-3) 50 mcg (2,000 unit) capsule TAKE ONE CAPSULE DAILY 90 capsule 0    ciprofloxacin (Cetraxal) 0.2 % otic solution Administer 0.25 mL into affected ear(s) in the morning and 0.25 mL at noon and 0.25 mL in the evening and 0.25 mL before bedtime.      diclofenac (Voltaren) 75 mg EC tablet Take 1 tablet (75 mg) by mouth 2 times a day. 60 tablet 0    diclofenac sodium 1 % kit Place 1 each on the skin.      diclofenac sodium 3 % gel Apply 1 Application topically 2 times a day as needed (Pain). 100 g 2    docusate sodium (Colace) 100 mg capsule Take 1 capsule (100 mg) by mouth in the morning and 1 capsule (100 mg) before bedtime.      DULoxetine (Cymbalta) 30 mg DR capsule Take 1 capsule (30 mg) by mouth once daily. 90 capsule 0    FeroSuL tablet TAKE 1 TABLET BY MOUTH ONCE DAILY. 30 tablet 2    fexofenadine (Allegra) 180 mg tablet Take 1 tablet (180 mg) by mouth once daily. 30 tablet 0    fluticasone  (Flonase) 50 mcg/actuation nasal spray Administer 1 spray into each nostril once daily.      fluticasone (Flonase) 50 mcg/actuation nasal spray Administer 1 spray into each nostril once daily. Shake gently. Before first use, prime pump. After use, clean tip and replace cap. 16 g 2    fluticasone propion-salmeteroL (Advair Diskus) 250-50 mcg/dose diskus inhaler Inhale 1 puff.      folic acid (Folvite) 1 mg tablet Take 1 tablet (1 mg) by mouth once daily. 30 tablet 2    glucosamine armas 2KCl-chondroit 750-600 mg tablet Take 1 each by mouth.      ipratropium (Atrovent HFA) 17 mcg/actuation inhaler Inhale 1 puff  in the morning and 1 puff at noon and 1 puff in the evening and 1 puff before bedtime.      ketorolac (Acular) 0.5 % ophthalmic solution Administer into both eyes 4 times a day.      levomilnacipranR (Fetzima) 40 mg extended release capsule Take 1 capsule (40 mg) by mouth once daily. 30 capsule 2    levothyroxine (Synthroid, Levoxyl) 100 mcg tablet Take 1 tablet (100 mcg) by mouth once daily. 90 tablet 0    loratadine (Claritin) 10 mg tablet Take 1 tablet (10 mg) by mouth once daily. 90 tablet 0    meclizine (Antivert) 25 mg tablet TAKE 1 TABLET 3 TIMES DAILY AS NEEDED FOR DIZZINESS 90 tablet 0    melatonin 5 mg tablet Take 1 tablet (5 mg) by mouth once daily at bedtime. 30 tablet 3    methotrexate (Trexall) 2.5 mg tablet Take 1 tablet (2.5 mg total) by mouth 1 (one) time per week.      nebivolol (Bystolic) 10 mg tablet Take 1 tablet (10 mg) by mouth once daily. 90 tablet 0    olopatadine (Patanol) 0.1 % ophthalmic solution Administer 1 drop into both eyes in the morning and 1 drop before bedtime.      omeprazole (PriLOSEC) 20 mg DR capsule Take 1 capsule (20 mg) by mouth once daily.      polyvinyl alcohol (Liquifilm Tears) 1.4 % ophthalmic solution Administer 1 drop into both eyes if needed.      predniSONE (Deltasone) 10 mg tablet Take 1 tablet (10 mg) by mouth once daily.      predniSONE (Deltasone) 20 mg  tablet Take 1 tablet (20 mg) by mouth once daily.      propranolol (Inderal) 10 mg tablet TAKE 1 TABLET (10 MG) BY MOUTH 2 TIMES A DAY. 60 tablet 2    Tab-A-Christel Multivitamin w-iron 15 mg iron- 400 mcg tablet TAKE 1 TABLET BY MOUTH ONCE DAILY. 30 tablet 2    VITAMIN A ORAL Take 1 tablet by mouth once daily.       No current facility-administered medications for this visit.       Assessment and Plan  Makenna was seen today for uri.  Diagnoses and all orders for this visit:  Bronchitis (Primary)  -     benzonatate (Tessalon) 100 mg capsule; Take 1 capsule (100 mg) by mouth 3 times a day as needed for cough. Do not crush or chew.  -     fluticasone (Flonase) 50 mcg/actuation nasal spray; Administer 1 spray into each nostril once daily. Shake gently. Before first use, prime pump. After use, clean tip and replace cap.  -     fexofenadine (Allegra) 180 mg tablet; Take 1 tablet (180 mg) by mouth once daily.  Pulmonary fibrosis (Multi)  -     albuterol 90 mcg/actuation inhaler; Inhale 1 puff every 4 hours if needed for wheezing.     Viral URI.  Supportive therapy with fluids, PRN NSAIDs or Tylenol and Tessalon pearls for cough.  Use saline spray every 2-3 hrs.  Let us know if symptoms are not better in a week.      Immunizations:  Immunization History   Administered Date(s) Administered    Influenza, High Dose Seasonal, Preservative Free 11/28/2016, 11/07/2017, 08/28/2018, 09/01/2020    Influenza, Unspecified 11/08/2011    Influenza, seasonal, injectable 09/24/2013    Pfizer Purple Cap SARS-CoV-2 11/24/2021, 12/17/2021    Pneumococcal conjugate vaccine, 13-valent (PREVNAR 13) 04/05/2017    Pneumococcal polysaccharide vaccine, 23-valent, age 2 years and older (PNEUMOVAX 23) 01/15/2018    Pneumococcal, Unspecified 11/16/2016     An interactive audio and video telecommunication system which permits real time communications between the patient (at the originating site) and provider (at the distant site) was utilized to provide  this telehealth service.    Verbal consent was requested and obtained from the patient on the day of encounter.  This is a virtual visit using HIPAA compliant video platform. It required patient-provider interaction for the medical decision making as documented.     I have communicated my name and active licensure. The patient's identity and physical location were verified at the time of this visit.   Either the patient or their legal representative has been informed of the risks and benefits of -- and alternatives to -- treatment through a remote evaluation and consents to proceed with the evaluation remotely.     Total time spent was 15 minutes for this encounter, including chart review, discussion with the patient and addressing their concerns.

## 2024-04-22 ENCOUNTER — TELEMEDICINE (OUTPATIENT)
Dept: PRIMARY CARE | Facility: CLINIC | Age: 84
End: 2024-04-22
Payer: COMMERCIAL

## 2024-04-22 DIAGNOSIS — J40 BRONCHITIS: Primary | ICD-10-CM

## 2024-04-22 PROCEDURE — 1160F RVW MEDS BY RX/DR IN RCRD: CPT | Performed by: INTERNAL MEDICINE

## 2024-04-22 PROCEDURE — 99442 PR PHYS/QHP TELEPHONE EVALUATION 11-20 MIN: CPT | Performed by: INTERNAL MEDICINE

## 2024-04-22 PROCEDURE — 1159F MED LIST DOCD IN RCRD: CPT | Performed by: INTERNAL MEDICINE

## 2024-04-22 RX ORDER — PREDNISONE 20 MG/1
20 TABLET ORAL DAILY
Qty: 5 TABLET | Refills: 0 | Status: SHIPPED | OUTPATIENT
Start: 2024-04-22 | End: 2024-04-27

## 2024-04-22 RX ORDER — DOXYCYCLINE 100 MG/1
100 CAPSULE ORAL 2 TIMES DAILY
Qty: 14 CAPSULE | Refills: 0 | Status: SHIPPED | OUTPATIENT
Start: 2024-04-22 | End: 2024-04-29

## 2024-04-22 RX ORDER — PREDNISONE 20 MG/1
20 TABLET ORAL DAILY
Qty: 5 TABLET | Refills: 0 | Status: SHIPPED | OUTPATIENT
Start: 2024-04-22 | End: 2024-04-22

## 2024-04-22 RX ORDER — DOXYCYCLINE 100 MG/1
100 CAPSULE ORAL 2 TIMES DAILY
Qty: 14 CAPSULE | Refills: 0 | Status: SHIPPED | OUTPATIENT
Start: 2024-04-22 | End: 2024-04-22

## 2024-04-22 ASSESSMENT — ENCOUNTER SYMPTOMS
COUGH: 1
CHOKING: 0
CHEST TIGHTNESS: 0
SHORTNESS OF BREATH: 1
WHEEZING: 1

## 2024-04-22 NOTE — PROGRESS NOTES
Subjective   Patient ID: Makenna Black is a 83 y.o. female who presents for Bronchitis.  HPI    Review of Systems   Respiratory:  Positive for cough, shortness of breath and wheezing. Negative for choking and chest tightness.        Objective   Physical Exam  Neurological:      Mental Status: She is alert.   Psychiatric:         Mood and Affect: Mood normal.         Assessment/Plan   Problem List Items Addressed This Visit       Bronchitis - Primary    Relevant Medications    doxycycline (Vibramycin) 100 mg capsule    predniSONE (Deltasone) 20 mg tablet    Other Relevant Orders    XR chest 2 views     Acute bronchitis.  Worsening course over the past few days.  Sputum production is not improving and cough is not getting better.  Recommend starting antibiotics and cough medications.  No need for CXRay at this time, we will reevaluate in a few days if symptoms still persist.  If symptoms get worse, go to ER.  Call back if not better in 72 hrs.    Total time spent was 15 minutes for this encounter, including chart review, discussion with the patient and addressing their concerns.         Ana M Gongora MD

## 2024-04-23 ENCOUNTER — HOSPITAL ENCOUNTER (OUTPATIENT)
Dept: RADIOLOGY | Facility: CLINIC | Age: 84
Discharge: HOME | End: 2024-04-23
Payer: COMMERCIAL

## 2024-04-23 DIAGNOSIS — J40 BRONCHITIS: ICD-10-CM

## 2024-04-23 PROCEDURE — 71046 X-RAY EXAM CHEST 2 VIEWS: CPT

## 2024-04-23 PROCEDURE — 71046 X-RAY EXAM CHEST 2 VIEWS: CPT | Performed by: RADIOLOGY

## 2024-05-07 DIAGNOSIS — D64.9 ANEMIA, UNSPECIFIED TYPE: ICD-10-CM

## 2024-05-07 RX ORDER — MULTIVIT/IRON SULF/FOLIC ACID 15MG-0.4MG
1 TABLET ORAL DAILY
Qty: 30 TABLET | Refills: 2 | Status: SHIPPED | OUTPATIENT
Start: 2024-05-07

## 2024-05-13 DIAGNOSIS — J40 BRONCHITIS: ICD-10-CM

## 2024-05-13 RX ORDER — MINERAL OIL
180 ENEMA (ML) RECTAL DAILY
Qty: 30 TABLET | Refills: 0 | Status: SHIPPED | OUTPATIENT
Start: 2024-05-13 | End: 2024-06-11

## 2024-06-03 DIAGNOSIS — E78.5 HYPERLIPIDEMIA, UNSPECIFIED HYPERLIPIDEMIA TYPE: ICD-10-CM

## 2024-06-03 DIAGNOSIS — G25.0 ESSENTIAL TREMOR: ICD-10-CM

## 2024-06-03 DIAGNOSIS — E55.9 VITAMIN D DEFICIENCY: ICD-10-CM

## 2024-06-03 DIAGNOSIS — R42 VERTIGO: ICD-10-CM

## 2024-06-03 RX ORDER — PROPRANOLOL HYDROCHLORIDE 10 MG/1
10 TABLET ORAL 2 TIMES DAILY
Qty: 60 TABLET | Refills: 2 | Status: SHIPPED | OUTPATIENT
Start: 2024-06-03 | End: 2024-09-01

## 2024-06-03 RX ORDER — ATORVASTATIN CALCIUM 40 MG/1
40 TABLET, FILM COATED ORAL DAILY
Qty: 30 TABLET | Refills: 0 | Status: SHIPPED | OUTPATIENT
Start: 2024-06-03

## 2024-06-03 RX ORDER — MULTIVITAMIN
2 TABLET ORAL DAILY
Qty: 60 TABLET | Refills: 2 | Status: SHIPPED | OUTPATIENT
Start: 2024-06-03

## 2024-06-11 DIAGNOSIS — J40 BRONCHITIS: ICD-10-CM

## 2024-06-11 RX ORDER — MINERAL OIL
180 ENEMA (ML) RECTAL DAILY
Qty: 30 TABLET | Refills: 0 | Status: SHIPPED | OUTPATIENT
Start: 2024-06-11 | End: 2025-06-11

## 2024-06-27 DIAGNOSIS — E55.9 VITAMIN D DEFICIENCY: ICD-10-CM

## 2024-06-27 DIAGNOSIS — I10 ESSENTIAL HYPERTENSION: ICD-10-CM

## 2024-06-27 DIAGNOSIS — E78.5 HYPERLIPIDEMIA, UNSPECIFIED HYPERLIPIDEMIA TYPE: ICD-10-CM

## 2024-06-27 DIAGNOSIS — R42 VERTIGO: ICD-10-CM

## 2024-06-27 RX ORDER — ASPIRIN 81 MG/1
81 TABLET ORAL DAILY
Qty: 90 TABLET | Refills: 0 | Status: SHIPPED | OUTPATIENT
Start: 2024-06-27 | End: 2024-09-25

## 2024-06-27 RX ORDER — ATORVASTATIN CALCIUM 40 MG/1
40 TABLET, FILM COATED ORAL DAILY
Qty: 90 TABLET | Refills: 0 | Status: SHIPPED | OUTPATIENT
Start: 2024-06-27

## 2024-07-08 DIAGNOSIS — E55.9 VITAMIN D DEFICIENCY: ICD-10-CM

## 2024-07-08 RX ORDER — ACETAMINOPHEN 500 MG
TABLET ORAL
Qty: 90 CAPSULE | Refills: 0 | Status: SHIPPED | OUTPATIENT
Start: 2024-07-08

## 2024-07-16 DIAGNOSIS — J40 BRONCHITIS: ICD-10-CM

## 2024-07-16 RX ORDER — MINERAL OIL
180 ENEMA (ML) RECTAL DAILY
Qty: 30 TABLET | Refills: 2 | Status: SHIPPED | OUTPATIENT
Start: 2024-07-16

## 2024-07-23 DIAGNOSIS — D64.9 ANEMIA, UNSPECIFIED TYPE: ICD-10-CM

## 2024-07-23 RX ORDER — MULTIVIT/IRON SULF/FOLIC ACID 15MG-0.4MG
1 TABLET ORAL DAILY
Qty: 30 TABLET | Refills: 2 | Status: SHIPPED | OUTPATIENT
Start: 2024-07-23

## 2024-07-30 ENCOUNTER — HOME VISIT (OUTPATIENT)
Dept: PRIMARY CARE | Facility: CLINIC | Age: 84
End: 2024-07-30
Payer: COMMERCIAL

## 2024-07-30 DIAGNOSIS — M06.9 RHEUMATOID ARTHRITIS, INVOLVING UNSPECIFIED SITE, UNSPECIFIED WHETHER RHEUMATOID FACTOR PRESENT (MULTI): ICD-10-CM

## 2024-07-30 DIAGNOSIS — F33.9 RECURRENT MAJOR DEPRESSIVE DISORDER, REMISSION STATUS UNSPECIFIED (CMS-HCC): Primary | ICD-10-CM

## 2024-07-30 PROCEDURE — 99375 HOME HEALTH CARE SUPERVISION: CPT | Performed by: INTERNAL MEDICINE

## 2024-07-30 NOTE — PROGRESS NOTES
More than 30 mins spent last month on reviewing and approving home care services for this patient.  Home care report was reviewed in details, medications reviewed, plan of care reviewed and communicated back with the home care rendering company.

## 2024-08-15 DIAGNOSIS — R42 VERTIGO: ICD-10-CM

## 2024-08-15 DIAGNOSIS — E55.9 VITAMIN D DEFICIENCY: ICD-10-CM

## 2024-08-15 RX ORDER — MULTIVITAMIN
2 TABLET ORAL DAILY
Qty: 60 TABLET | Refills: 2 | Status: SHIPPED | OUTPATIENT
Start: 2024-08-15

## 2024-08-20 DIAGNOSIS — E55.9 VITAMIN D DEFICIENCY: ICD-10-CM

## 2024-08-20 DIAGNOSIS — R42 VERTIGO: ICD-10-CM

## 2024-08-20 RX ORDER — MULTIVITAMIN
2 TABLET ORAL DAILY
Qty: 60 TABLET | Refills: 2 | Status: SHIPPED | OUTPATIENT
Start: 2024-08-20

## 2024-08-21 ENCOUNTER — OFFICE VISIT (OUTPATIENT)
Dept: NEUROLOGY | Facility: CLINIC | Age: 84
End: 2024-08-21
Payer: COMMERCIAL

## 2024-08-21 VITALS
HEART RATE: 77 BPM | DIASTOLIC BLOOD PRESSURE: 92 MMHG | SYSTOLIC BLOOD PRESSURE: 172 MMHG | HEIGHT: 60 IN | BODY MASS INDEX: 36.52 KG/M2

## 2024-08-21 DIAGNOSIS — R26.89 IMBALANCE: ICD-10-CM

## 2024-08-21 DIAGNOSIS — G25.0 ESSENTIAL TREMOR: Primary | ICD-10-CM

## 2024-08-21 PROCEDURE — 99214 OFFICE O/P EST MOD 30 MIN: CPT | Performed by: PSYCHIATRY & NEUROLOGY

## 2024-08-21 PROCEDURE — 3080F DIAST BP >= 90 MM HG: CPT | Performed by: PSYCHIATRY & NEUROLOGY

## 2024-08-21 PROCEDURE — 1036F TOBACCO NON-USER: CPT | Performed by: PSYCHIATRY & NEUROLOGY

## 2024-08-21 PROCEDURE — 3077F SYST BP >= 140 MM HG: CPT | Performed by: PSYCHIATRY & NEUROLOGY

## 2024-08-21 PROCEDURE — 1159F MED LIST DOCD IN RCRD: CPT | Performed by: PSYCHIATRY & NEUROLOGY

## 2024-08-21 PROCEDURE — 1160F RVW MEDS BY RX/DR IN RCRD: CPT | Performed by: PSYCHIATRY & NEUROLOGY

## 2024-08-21 NOTE — PROGRESS NOTES
Subjective     Makenna Black is a 84 y.o. year old female seen in follow-up for essential tremor; also complains of gait instability.    HPI    84-year-old right-handed [place]ian woman with past medical history significant for hypertension, hypothyroidism on replacement, asthma/COPD, rheumatoid arthritis, hyperlipidemia, COVID-19 infection around March 2021, depression and anxiety. She speaks little English and presents with an .     I evaluated her initially on 5/19/2022. She was referred for tremor of both hands which appeared compatible with essential tremor. She was on both Bystolic 10 mg daily and propranolol 10 mg daily. After conferring with her PCP I recommended stopping Bystolic and titrating propranolol gradually to 20 mg twice daily.     I evaluated her again on 8/30/2022. She reported significant improvement in hand tremor on propranolol 20 mg twice daily. She was off Bystolic. She indicated tolerating propranolol well without noted side effects and I recommended she continue that regimen and see me in 1 year.    I evaluated her most recently on 8/25/2023.  She indicated at that time that propranolol 20 mg twice daily was not adequately controlling her hand tremor and I recommended titrating to 30 mg twice daily.    She is evaluated again today in the office, accompanied by her  Lucrecia.    Her hand tremor seems somewhat improved for the most part, which the patient attributes to less stress in her life.  She does okay with use of utensils and drinking from a cup/glass.  She still notes intermittent difficulty with handwriting however.  The tremor waxes and wanes in intensity.    Her right hand seems improved while she is not as sure about the left, in terms of use of utensils.    She seems to indicate that she is taking propranolol as just 1 tablet once a day, in the morning.  This would apparently be 10 mg every morning, despite the fact that at the last visit she was taking 20 mg twice  daily which I advised increasing to 30 mg twice daily.  However, she was not absolutely certain of her medications.    She indicates feeling off balance for about the past year and normally uses a rollator but presents today without assistive devices.  She denies completed falls but indicates that she often feels as if she is going to fall to her left.  She often touches walls for stability.      She can develop acute dizziness and loses balance if she looks up or down or turns her head to either side rapidly.  She seems to indicate the dizziness being more of a lightheaded sensation than vertigo.      She does have a history of tinnitus and right hearing loss for which she has seen ENT at [facility].  Her last brain MRI was done in 2022.    She does not endorse headaches.    Review of Systems    As per the history of present illness    Patient Active Problem List   Diagnosis    Abnormality of gait and mobility    Acute bronchitis    Acute otitis media    Bronchitis    Chronic obstructive pulmonary disease (Multi)    COPD exacerbation (Multi)    Anemia    Carpal tunnel syndrome    Cervical radiculopathy    Cholelithiasis    Chronic constipation    COVID-19 virus infection    Dehydration    Depression, major    Depression    Dizziness    Vertigo    Dry eyes    Earache    Essential hypertension    Essential tremor    Hyperlipidemia    Hypothyroidism    IFG (impaired fasting glucose)    Insomnia    Left sided abdominal pain    Lung nodules    Nonspecific abnormal results of function study of liver    Osteoarthritis of knee    Pneumonia    Primary osteoarthritis of both knees    Rheumatoid arthritis (Multi)    Shoulder pain    SOB (shortness of breath)    Tremor    Tremor of both hands    Tuberculosis    Urinary incontinence    Vitamin D deficiency    Pulmonary arterial hypertension (Multi)    Obesity, morbid (Multi)    COVID-19    Flank pain    Hypovitaminosis D    Major depressive disorder in partial remission, unspecified  whether recurrent (CMS-[facility])    Major depressive disorder, recurrent, moderate (Multi)     Past Medical History:   Diagnosis Date    Body mass index (BMI) 34.0-34.9, adult 08/02/2019    Body mass index (BMI) of 34.0 to 34.9 in adult    Body mass index (BMI) 36.0-36.9, adult 10/05/2021    BMI 36.0-36.9,adult    Body mass index (BMI) 37.0-37.9, adult 07/06/2021    Body mass index (BMI) of 37.0 to 37.9 in adult    Body mass index (BMI) 37.0-37.9, adult 01/04/2022    Body mass index (BMI) of 37.0 to 37.9 in adult    Encounter for screening mammogram for malignant neoplasm of breast     Visit for screening mammogram    Morbid (severe) obesity due to excess calories (Multi) 01/04/2022    Class 2 severe obesity with serious comorbidity and body mass index (BMI) of 37.0 to 37.9 in adult, unspecified obesity type    Morbid (severe) obesity due to excess calories (Multi) 10/05/2021    Class 2 severe obesity with serious comorbidity and body mass index (BMI) of 36.0 to 36.9 in adult, unspecified obesity type    Personal history of other diseases of the digestive system     History of cholelithiasis     No past surgical history on file.  Social History     Tobacco Use    Smoking status: Never    Smokeless tobacco: Never   Substance Use Topics    Alcohol use: Not Currently     family history is not on file.    Current Outpatient Medications:     acetaminophen (Tylenol) 500 mg tablet, Take 1 tablet (500 mg) by mouth every 6 hours if needed for mild pain (1 - 3)., Disp: 30 tablet, Rfl: 3    acetaminophen 500 mg capsule, Take 1 tablet by mouth in the morning and 1 tablet at noon and 1 tablet in the evening and 1 tablet before bedtime., Disp: , Rfl:     adalimumab (Humira) 40 mg/0.8 mL syringe kit prefilled syringe, Inject 1 each (40 mg) under the skin., Disp: , Rfl:     albuterol 2.5 mg /3 mL (0.083 %) nebulizer solution, Take 3 mL (2.5 mg) by nebulization every 6 hours if needed for shortness of breath., Disp: 75 mL, Rfl: 3     albuterol 90 mcg/actuation inhaler, Inhale 1 puff every 4 hours if needed for wheezing., Disp: 8.5 g, Rfl: 3    ammonium lactate (Amlactin) 12 % cream, 1 Application., Disp: , Rfl:     aspirin 81 mg EC tablet, TAKE 1 TABLET (81 MG) BY MOUTH ONCE DAILY. WITH FOOD, Disp: 90 tablet, Rfl: 0    atorvastatin (Lipitor) 40 mg tablet, TAKE 1 TABLET (40 MG) BY MOUTH ONCE DAILY., Disp: 90 tablet, Rfl: 0    calcium carbonate-vitamin D3 600 mg-10 mcg (400 unit) tablet, TAKE 2 TABLETS BY MOUTH ONCE DAILY., Disp: 60 tablet, Rfl: 2    calcium carbonate-vitamin D3 600 mg-5 mcg (200 unit) tablet, Take 1 tablet by mouth in the morning and 1 tablet before bedtime., Disp: , Rfl:     cholecalciferol (Vitamin D-3) 50 mcg (2,000 unit) capsule, TAKE ONE CAPSULE DAILY, Disp: 90 capsule, Rfl: 0    ciprofloxacin (Cetraxal) 0.2 % otic solution, Administer 0.25 mL into affected ear(s) in the morning and 0.25 mL at noon and 0.25 mL in the evening and 0.25 mL before bedtime., Disp: , Rfl:     diclofenac sodium 1 % kit, Place 1 each on the skin., Disp: , Rfl:     diclofenac sodium 3 % gel, Apply 1 Application topically 2 times a day as needed (Pain)., Disp: 100 g, Rfl: 2    docusate sodium (Colace) 100 mg capsule, Take 1 capsule (100 mg) by mouth in the morning and 1 capsule (100 mg) before bedtime., Disp: , Rfl:     DULoxetine (Cymbalta) 30 mg DR capsule, Take 1 capsule (30 mg) by mouth once daily., Disp: 90 capsule, Rfl: 0    FeroSuL tablet, TAKE 1 TABLET BY MOUTH ONCE DAILY., Disp: 30 tablet, Rfl: 2    fexofenadine (Allegra) 180 mg tablet, Take 1 tablet (180 mg) by mouth once daily., Disp: 30 tablet, Rfl: 2    fluticasone (Flonase) 50 mcg/actuation nasal spray, Administer 1 spray into each nostril once daily., Disp: , Rfl:     fluticasone (Flonase) 50 mcg/actuation nasal spray, Administer 1 spray into each nostril once daily. Shake gently. Before first use, prime pump. After use, clean tip and replace cap., Disp: 16 g, Rfl: 2    fluticasone  propion-salmeteroL (Advair Diskus) 250-50 mcg/dose diskus inhaler, Inhale 1 puff., Disp: , Rfl:     folic acid (Folvite) 1 mg tablet, Take 1 tablet (1 mg) by mouth once daily., Disp: 30 tablet, Rfl: 2    glucosamine armas 2KCl-chondroit 750-600 mg tablet, Take 1 each by mouth., Disp: , Rfl:     ipratropium (Atrovent HFA) 17 mcg/actuation inhaler, Inhale 1 puff  in the morning and 1 puff at noon and 1 puff in the evening and 1 puff before bedtime., Disp: , Rfl:     ketorolac (Acular) 0.5 % ophthalmic solution, Administer into both eyes 4 times a day., Disp: , Rfl:     levomilnacipranR (Fetzima) 40 mg extended release capsule, Take 1 capsule (40 mg) by mouth once daily., Disp: 30 capsule, Rfl: 2    levothyroxine (Synthroid, Levoxyl) 100 mcg tablet, Take 1 tablet (100 mcg) by mouth once daily., Disp: 90 tablet, Rfl: 1    loratadine (Claritin) 10 mg tablet, Take 1 tablet (10 mg) by mouth once daily., Disp: 90 tablet, Rfl: 0    meclizine (Antivert) 25 mg tablet, TAKE 1 TABLET 3 TIMES DAILY AS NEEDED FOR DIZZINESS, Disp: 90 tablet, Rfl: 0    methotrexate (Trexall) 2.5 mg tablet, Take 1 tablet (2.5 mg total) by mouth 1 (one) time per week., Disp: , Rfl:     nebivolol (Bystolic) 10 mg tablet, Take 1 tablet (10 mg) by mouth once daily., Disp: 90 tablet, Rfl: 0    olopatadine (Patanol) 0.1 % ophthalmic solution, Administer 1 drop into both eyes in the morning and 1 drop before bedtime., Disp: , Rfl:     omeprazole (PriLOSEC) 20 mg DR capsule, Take 1 capsule (20 mg) by mouth once daily., Disp: , Rfl:     polyvinyl alcohol (Liquifilm Tears) 1.4 % ophthalmic solution, Administer 1 drop into both eyes if needed., Disp: , Rfl:     predniSONE (Deltasone) 10 mg tablet, Take 1 tablet (10 mg) by mouth once daily., Disp: , Rfl:     predniSONE (Deltasone) 20 mg tablet, Take 1 tablet (20 mg) by mouth once daily., Disp: , Rfl:     propranolol (Inderal) 10 mg tablet, TAKE 1 TABLET (10 MG) BY MOUTH 2 TIMES A DAY., Disp: 60 tablet, Rfl: 2     Tab-A-Christel Multivitamin w-iron 15 mg iron- 400 mcg tablet, TAKE 1 TABLET BY MOUTH ONCE DAILY., Disp: 30 tablet, Rfl: 2    VITAMIN A ORAL, Take 1 tablet by mouth once daily., Disp: , Rfl:   No Known Allergies    Objective   Neurological Exam  Physical Exam    Physical Examination:    General: Alert woman who was ambulatory without assistive devices.      Mental Status: Clear sensorium without fluctuation.  Appropriate in conversation with translation.  Followed instructions accurately and promptly without bradyphrenia.    Cranial Nerves:  Funduscopic exam was not well visualized bilaterally on nondilated exam.  Pupils were equal, round and reactive to light with no relative afferent pupillary defect.  Extraocular movements were intact and conjugate; there was gaze evoked nystagmus (several beats, not sustained) at either horizontal extreme.  No ptosis.  Visual fields were full to confrontation tested binocularly.  Facial sensation was symmetric to pin.  Facial motor function was symmetrically intact.  Hearing was grossly intact.  No dysarthria.  Shoulder shrug was symmetric.  Tongue protrusion was midline.    Motor: Muscle bulk and tone were normal throughout. There was no pronator drift. Confrontation strength was symmetrically 5/5 throughout the upper and lower extremities.     Coordination: Finger to finger was accurate bilaterally without dysmetria or intention tremor.  Alternating hand movements were regular and symmetric.  There was a mild left greater than right rapid low amplitude postural-kinetic tremor.  No head or voice tremor.  No rest tremor, myoclonus or dystonic posturing.    Station: Intact and stable.    Gait: Stable and remarkable only for a tentative, mildly slow appearance.      Assessment/Plan     From the standpoint of essential tremor, she displays only a mild degree of tremor today despite indicating taking propranolol as just 10 mg every morning instead of 20 or 30 mg twice daily as we had  discussed previously.  Her blood pressure is again significantly elevated as it was at the previous visit.  She is still bothered by tremor during some activities including handwriting.  I suggested increasing propranolol back to 20 mg twice daily if tolerated.    However, I advised her to check her medications when she gets home and determine whether in fact she has only been taking 10 mg every morning of propranolol.  She may be confusing it with another medication.    She endorses new gait/balance issues just over the past year, albeit in the context of a longer history of otologic symptoms.  I recommended an IAC protocol MRI with gadolinium.  She had recent normal creatinine on a metabolic panel earlier this month.  I will contact her  (at [phone]) with study results.  She will continue using her rollator to reduce risk of falls, albeit is walking without it today.    Otherwise I advised her to follow-up in the office in 1 year.

## 2024-08-21 NOTE — PATIENT INSTRUCTIONS
For better tremor control and also because your blood pressure is significantly elevated, I recommend increasing propranolol to 2 pills twice a day, if you are still taking the 10 mg pill.  Please check your dose when you get home.    I recommend a brain MRI because of your unsteadiness on your feet and the feeling as if you are about to fall off to your left side.  I will call Lucrecia with results.    Please see me in 1 year.

## 2024-09-03 ENCOUNTER — APPOINTMENT (OUTPATIENT)
Dept: PRIMARY CARE | Facility: CLINIC | Age: 84
End: 2024-09-03
Payer: COMMERCIAL

## 2024-09-03 ENCOUNTER — LAB (OUTPATIENT)
Dept: LAB | Facility: LAB | Age: 84
End: 2024-09-03
Payer: COMMERCIAL

## 2024-09-03 VITALS — SYSTOLIC BLOOD PRESSURE: 130 MMHG | DIASTOLIC BLOOD PRESSURE: 90 MMHG

## 2024-09-03 DIAGNOSIS — M81.8 ADULT IDIOPATHIC GENERALIZED OSTEOPOROSIS: ICD-10-CM

## 2024-09-03 DIAGNOSIS — E55.9 HYPOVITAMINOSIS D: ICD-10-CM

## 2024-09-03 DIAGNOSIS — R06.02 SOB (SHORTNESS OF BREATH): Primary | ICD-10-CM

## 2024-09-03 DIAGNOSIS — E06.3 HYPOTHYROIDISM DUE TO HASHIMOTO'S THYROIDITIS: ICD-10-CM

## 2024-09-03 DIAGNOSIS — E03.8 HYPOTHYROIDISM DUE TO HASHIMOTO'S THYROIDITIS: ICD-10-CM

## 2024-09-03 DIAGNOSIS — J84.10 PULMONARY FIBROSIS (MULTI): ICD-10-CM

## 2024-09-03 DIAGNOSIS — I10 ESSENTIAL HYPERTENSION: ICD-10-CM

## 2024-09-03 DIAGNOSIS — Z13.820 SCREENING FOR OSTEOPOROSIS: ICD-10-CM

## 2024-09-03 DIAGNOSIS — E13.9 OTHER SPECIFIED DIABETES MELLITUS WITHOUT COMPLICATION, WITHOUT LONG-TERM CURRENT USE OF INSULIN (MULTI): ICD-10-CM

## 2024-09-03 DIAGNOSIS — M06.9 RHEUMATOID ARTHRITIS, INVOLVING UNSPECIFIED SITE, UNSPECIFIED WHETHER RHEUMATOID FACTOR PRESENT (MULTI): ICD-10-CM

## 2024-09-03 DIAGNOSIS — I10 ESSENTIAL HYPERTENSION: Primary | ICD-10-CM

## 2024-09-03 DIAGNOSIS — I50.43 CHF (CONGESTIVE HEART FAILURE), NYHA CLASS I, ACUTE ON CHRONIC, COMBINED (MULTI): ICD-10-CM

## 2024-09-03 LAB
25(OH)D3 SERPL-MCNC: 49 NG/ML (ref 30–100)
ALBUMIN SERPL BCP-MCNC: 4.5 G/DL (ref 3.4–5)
ALP SERPL-CCNC: 42 U/L (ref 33–136)
ALT SERPL W P-5'-P-CCNC: 27 U/L (ref 7–45)
ANION GAP SERPL CALC-SCNC: 14 MMOL/L (ref 10–20)
AST SERPL W P-5'-P-CCNC: 20 U/L (ref 9–39)
BASOPHILS # BLD AUTO: 0.02 X10*3/UL (ref 0–0.1)
BASOPHILS NFR BLD AUTO: 0.3 %
BILIRUB SERPL-MCNC: 0.4 MG/DL (ref 0–1.2)
BUN SERPL-MCNC: 16 MG/DL (ref 6–23)
CALCIUM SERPL-MCNC: 9.5 MG/DL (ref 8.6–10.6)
CHLORIDE SERPL-SCNC: 103 MMOL/L (ref 98–107)
CHOLEST SERPL-MCNC: 246 MG/DL (ref 0–199)
CHOLESTEROL/HDL RATIO: 3.2
CO2 SERPL-SCNC: 29 MMOL/L (ref 21–32)
CREAT SERPL-MCNC: 0.68 MG/DL (ref 0.5–1.05)
EGFRCR SERPLBLD CKD-EPI 2021: 87 ML/MIN/1.73M*2
EOSINOPHIL # BLD AUTO: 0.27 X10*3/UL (ref 0–0.4)
EOSINOPHIL NFR BLD AUTO: 4.4 %
ERYTHROCYTE [DISTWIDTH] IN BLOOD BY AUTOMATED COUNT: 13.9 % (ref 11.5–14.5)
EST. AVERAGE GLUCOSE BLD GHB EST-MCNC: 114 MG/DL
GLUCOSE SERPL-MCNC: 101 MG/DL (ref 74–99)
HBA1C MFR BLD: 5.6 %
HCT VFR BLD AUTO: 44.5 % (ref 36–46)
HDLC SERPL-MCNC: 75.7 MG/DL
HGB BLD-MCNC: 14.5 G/DL (ref 12–16)
IMM GRANULOCYTES # BLD AUTO: 0.02 X10*3/UL (ref 0–0.5)
IMM GRANULOCYTES NFR BLD AUTO: 0.3 % (ref 0–0.9)
LDLC SERPL CALC-MCNC: 151 MG/DL
LYMPHOCYTES # BLD AUTO: 2.06 X10*3/UL (ref 0.8–3)
LYMPHOCYTES NFR BLD AUTO: 33.9 %
MCH RBC QN AUTO: 30.5 PG (ref 26–34)
MCHC RBC AUTO-ENTMCNC: 32.6 G/DL (ref 32–36)
MCV RBC AUTO: 94 FL (ref 80–100)
MONOCYTES # BLD AUTO: 0.53 X10*3/UL (ref 0.05–0.8)
MONOCYTES NFR BLD AUTO: 8.7 %
NEUTROPHILS # BLD AUTO: 3.17 X10*3/UL (ref 1.6–5.5)
NEUTROPHILS NFR BLD AUTO: 52.4 %
NON HDL CHOLESTEROL: 170 MG/DL (ref 0–149)
NRBC BLD-RTO: 0 /100 WBCS (ref 0–0)
PLATELET # BLD AUTO: 275 X10*3/UL (ref 150–450)
POTASSIUM SERPL-SCNC: 4.7 MMOL/L (ref 3.5–5.3)
PROT SERPL-MCNC: 6.9 G/DL (ref 6.4–8.2)
RBC # BLD AUTO: 4.75 X10*6/UL (ref 4–5.2)
SODIUM SERPL-SCNC: 141 MMOL/L (ref 136–145)
TRIGL SERPL-MCNC: 98 MG/DL (ref 0–149)
TSH SERPL-ACNC: 0.9 MIU/L (ref 0.44–3.98)
VLDL: 20 MG/DL (ref 0–40)
WBC # BLD AUTO: 6.1 X10*3/UL (ref 4.4–11.3)

## 2024-09-03 PROCEDURE — 84443 ASSAY THYROID STIM HORMONE: CPT

## 2024-09-03 PROCEDURE — 85025 COMPLETE CBC W/AUTO DIFF WBC: CPT

## 2024-09-03 PROCEDURE — 3075F SYST BP GE 130 - 139MM HG: CPT | Performed by: INTERNAL MEDICINE

## 2024-09-03 PROCEDURE — G2211 COMPLEX E/M VISIT ADD ON: HCPCS | Performed by: INTERNAL MEDICINE

## 2024-09-03 PROCEDURE — 36415 COLL VENOUS BLD VENIPUNCTURE: CPT

## 2024-09-03 PROCEDURE — 82306 VITAMIN D 25 HYDROXY: CPT

## 2024-09-03 PROCEDURE — 80053 COMPREHEN METABOLIC PANEL: CPT

## 2024-09-03 PROCEDURE — 99214 OFFICE O/P EST MOD 30 MIN: CPT | Performed by: INTERNAL MEDICINE

## 2024-09-03 PROCEDURE — 82043 UR ALBUMIN QUANTITATIVE: CPT

## 2024-09-03 PROCEDURE — 3080F DIAST BP >= 90 MM HG: CPT | Performed by: INTERNAL MEDICINE

## 2024-09-03 PROCEDURE — 83036 HEMOGLOBIN GLYCOSYLATED A1C: CPT

## 2024-09-03 PROCEDURE — 82570 ASSAY OF URINE CREATININE: CPT

## 2024-09-03 PROCEDURE — 80061 LIPID PANEL: CPT

## 2024-09-03 RX ORDER — ACETAMINOPHEN 500 MG
1 TABLET ORAL DAILY
Qty: 1 KIT | Refills: 0 | Status: SHIPPED | OUTPATIENT
Start: 2024-09-03

## 2024-09-03 ASSESSMENT — ENCOUNTER SYMPTOMS
CONSTITUTIONAL NEGATIVE: 1
WHEEZING: 0
EYES NEGATIVE: 1
WEAKNESS: 0
BACK PAIN: 0
STRIDOR: 0
PALPITATIONS: 0
TREMORS: 1
CHEST TIGHTNESS: 1
GASTROINTESTINAL NEGATIVE: 1
COUGH: 1
SPEECH DIFFICULTY: 0
ARTHRALGIAS: 1
JOINT SWELLING: 1
APNEA: 0
LIGHT-HEADEDNESS: 0
SHORTNESS OF BREATH: 1
SEIZURES: 0

## 2024-09-03 NOTE — PATIENT INSTRUCTIONS
?? ??????????? ??? ??????? ???????? ?? RSV. ??? ???????? ???????? ????? ? ????????? ????????????? ?? ?????????????? ??????. ?? ?????? ?????? ?? ? ??????? ??????, ? ?????????, ? ??? ? ????? ?? ???.

## 2024-09-03 NOTE — PROGRESS NOTES
Subjective   Patient ID: Makenna Black is a 84 y.o. female who presents for Follow-up. She continues to have an arthritic flare from July. In the past, she got relief with acupuncture, but was informed that further treatment would not be covered by her insurance. She notes shortness of breath on minimal exertion, such as getting up from bed. Her tremor is better on 20mg daily propranolol compared to 10mg daily.    Review of Systems   Constitutional: Negative.    HENT:  Positive for congestion and tinnitus.    Eyes: Negative.    Respiratory:  Positive for cough, chest tightness and shortness of breath. Negative for apnea, wheezing and stridor.    Cardiovascular:  Positive for leg swelling. Negative for chest pain and palpitations.   Gastrointestinal: Negative.    Genitourinary: Negative.    Musculoskeletal:  Positive for arthralgias and joint swelling. Negative for back pain and gait problem.   Skin: Negative.    Neurological:  Positive for tremors. Negative for seizures, speech difficulty, weakness and light-headedness.     Objective   /90    Lab Results   Component Value Date    WBC 7.9 01/31/2024    HGB 13.9 01/31/2024    HCT 43.5 01/31/2024    MCV 92 01/31/2024     01/31/2024      Lab Results   Component Value Date    GLUCOSE 88 01/31/2024    CALCIUM 9.6 01/31/2024     01/31/2024    K 4.3 01/31/2024    CO2 28 01/31/2024     01/31/2024    BUN 10 01/31/2024    CREATININE 0.58 01/31/2024     Lipids  Lab Results   Component Value Date    CHOL 316 (H) 01/31/2024    CHOL 247 (H) 08/14/2023    CHOL 234 (H) 10/11/2022     Lab Results   Component Value Date    HDL 71.4 01/31/2024    HDL 77.3 08/14/2023    HDL 80.4 10/11/2022     Lab Results   Component Value Date    LDLCALC 211 01/31/2024     Lab Results   Component Value Date    TRIG 166 (H) 01/31/2024    TRIG 152 (H) 08/14/2023    TRIG 119 10/11/2022     Physical Exam  Constitutional:       General: She is not in acute distress.     Appearance:  Normal appearance. She is obese. She is not ill-appearing.   HENT:      Head: Normocephalic and atraumatic.      Nose: Nose normal.      Mouth/Throat:      Mouth: Mucous membranes are moist.      Pharynx: Oropharynx is clear.   Eyes:      Extraocular Movements: Extraocular movements intact.      Pupils: Pupils are equal, round, and reactive to light.   Cardiovascular:      Rate and Rhythm: Normal rate and regular rhythm.      Heart sounds: Normal heart sounds.   Pulmonary:      Effort: No respiratory distress.      Breath sounds: No stridor. No wheezing or rales.      Comments: Mild dyspnea with speaking  Could speak for ~25 seconds before being short of breath  Diminished airflow bilaterally  Abdominal:      General: Abdomen is flat. Bowel sounds are normal.      Palpations: Abdomen is soft.   Musculoskeletal:      Right lower leg: Edema present.      Left lower leg: Edema present.      Comments: 1+ pitting edema bilaterally   Neurological:      Mental Status: She is alert.     Assessment/Plan   Makenna Black is an 83 y.o. female here for a Medicare Wellness visit. She is Bulgarian speaking and the visit was conducted with the use of a . She has a history of osteoarthritis, HTN, HLD, hypothyroidism, rheumatoid arthritis, pulmonary fibrosis and nodules 2/2 rheumatoid arthritis, hypovitaminosis D, and impaired fasting glucose.    # HTN  -Today at the office was 130/90  - Propranolol 20mg    # Essential tremor  - Follows with neurology  - Continue propranolol 20mg     # HLD  - Continue atorvastatin     # Pre-diabetic  - Was advised to reduce carbohydrates consumption  - Monitor A1c     #Hypothyroidism  - She takes levothyroxine 100 mcg as prescribed     #RA   #osteoarthritis  - She takes methotrexate 2.5 mg and hydroxychloroquine as perscribed  - Was advised to resume diclofenac sodium 75mg and start doclofenac sodium gel as needed  - Referral to [facility] for acupuncture     #Insomnia  - Melatonin 5 mg     #  Dyspnea on exertion  :: Ruleout heart failure  - Echocardiogram     # Pulmonary fibrosis  # Asthma  # Stable pulmonary nodules 2/2 RA  - Reinforced proper use of inhalers  - Trial sample of Trelegy, once daily     # Hearing loss  - She was not using hearing aids at the office  - She continues to use them on both sides      # Vertigo  - Meclazine     # Routine health maintenance  - Declined flu shot  - CMP, urine albumin, Vitiamin D, CBC, and A1c, lipids ordered 9/2024  - RSV recommended

## 2024-09-04 LAB
CREAT UR-MCNC: 70.7 MG/DL (ref 20–320)
MICROALBUMIN UR-MCNC: <7 MG/L
MICROALBUMIN/CREAT UR: NORMAL MG/G{CREAT}

## 2024-09-05 DIAGNOSIS — G25.0 ESSENTIAL TREMOR: ICD-10-CM

## 2024-09-05 DIAGNOSIS — D64.9 ANEMIA, UNSPECIFIED TYPE: ICD-10-CM

## 2024-09-05 DIAGNOSIS — E78.5 HYPERLIPIDEMIA, UNSPECIFIED HYPERLIPIDEMIA TYPE: ICD-10-CM

## 2024-09-05 RX ORDER — PROPRANOLOL HYDROCHLORIDE 10 MG/1
10 TABLET ORAL 2 TIMES DAILY
Qty: 60 TABLET | Refills: 2 | Status: SHIPPED | OUTPATIENT
Start: 2024-09-05 | End: 2024-12-04

## 2024-09-05 RX ORDER — MULTIVIT/IRON SULF/FOLIC ACID 15MG-0.4MG
1 TABLET ORAL DAILY
Qty: 30 TABLET | Refills: 2 | Status: SHIPPED | OUTPATIENT
Start: 2024-09-05

## 2024-09-05 RX ORDER — ATORVASTATIN CALCIUM 40 MG/1
40 TABLET, FILM COATED ORAL DAILY
Qty: 90 TABLET | Refills: 0 | Status: SHIPPED | OUTPATIENT
Start: 2024-09-05

## 2024-09-10 ENCOUNTER — HOSPITAL ENCOUNTER (OUTPATIENT)
Dept: RADIOLOGY | Facility: CLINIC | Age: 84
Discharge: HOME | End: 2024-09-10
Payer: COMMERCIAL

## 2024-09-10 DIAGNOSIS — R26.89 IMBALANCE: ICD-10-CM

## 2024-09-10 PROCEDURE — A9575 INJ GADOTERATE MEGLUMI 0.1ML: HCPCS | Performed by: PSYCHIATRY & NEUROLOGY

## 2024-09-10 PROCEDURE — 2550000001 HC RX 255 CONTRASTS: Performed by: PSYCHIATRY & NEUROLOGY

## 2024-09-10 PROCEDURE — 70553 MRI BRAIN STEM W/O & W/DYE: CPT

## 2024-09-10 RX ORDER — GADOTERATE MEGLUMINE 376.9 MG/ML
17 INJECTION INTRAVENOUS
Status: COMPLETED | OUTPATIENT
Start: 2024-09-10 | End: 2024-09-10

## 2024-09-15 ENCOUNTER — OFFICE VISIT (OUTPATIENT)
Dept: URGENT CARE | Age: 84
End: 2024-09-15
Payer: COMMERCIAL

## 2024-09-15 ENCOUNTER — CLINICAL SUPPORT (OUTPATIENT)
Dept: URGENT CARE | Age: 84
End: 2024-09-15
Payer: COMMERCIAL

## 2024-09-15 VITALS
RESPIRATION RATE: 16 BRPM | HEIGHT: 62 IN | TEMPERATURE: 97.8 F | OXYGEN SATURATION: 98 % | DIASTOLIC BLOOD PRESSURE: 90 MMHG | WEIGHT: 185 LBS | HEART RATE: 84 BPM | BODY MASS INDEX: 34.04 KG/M2 | SYSTOLIC BLOOD PRESSURE: 163 MMHG

## 2024-09-15 DIAGNOSIS — M54.32 SCIATICA, LEFT SIDE: ICD-10-CM

## 2024-09-15 DIAGNOSIS — M54.32 SCIATICA, LEFT SIDE: Primary | ICD-10-CM

## 2024-09-15 PROCEDURE — 73502 X-RAY EXAM HIP UNI 2-3 VIEWS: CPT | Mod: LEFT SIDE | Performed by: PHYSICIAN ASSISTANT

## 2024-09-15 RX ORDER — PREDNISONE 20 MG/1
40 TABLET ORAL DAILY
Qty: 10 TABLET | Refills: 0 | Status: SHIPPED | OUTPATIENT
Start: 2024-09-15 | End: 2024-09-15

## 2024-09-15 RX ORDER — PREDNISONE 20 MG/1
40 TABLET ORAL DAILY
Qty: 10 TABLET | Refills: 0 | Status: SHIPPED | OUTPATIENT
Start: 2024-09-15 | End: 2024-09-20

## 2024-09-15 NOTE — PROGRESS NOTES
Subjective   Patient ID: Makenna Black is a 84 y.o. female. They present today with a chief complaint of Hip Pain left (Pain in left hip radiating down entire leg.  No trauma.  Pt has history of Sciatica in that area ranging since early ages.).    History of Present Illness  H/o rheumatoid arthritis on methotrexate weekly and prednisone prn    Reports atraumatic severe left hip pain x3 days with radiation down the entire back of the left leg consistent with when she had sciatica 30 years ago. Has been using ketorolac with no relief. Pain worsened when walking with her walker. Denies fever.       History provided by:  Patient and caregiver (grandson)      Past Medical History  Allergies as of 09/15/2024    (Not on File)       (Not in a hospital admission)       Past Medical History:   Diagnosis Date    Body mass index (BMI) 34.0-34.9, adult 08/02/2019    Body mass index (BMI) of 34.0 to 34.9 in adult    Body mass index (BMI) 36.0-36.9, adult 10/05/2021    BMI 36.0-36.9,adult    Body mass index (BMI) 37.0-37.9, adult 07/06/2021    Body mass index (BMI) of 37.0 to 37.9 in adult    Body mass index (BMI) 37.0-37.9, adult 01/04/2022    Body mass index (BMI) of 37.0 to 37.9 in adult    Encounter for screening mammogram for malignant neoplasm of breast     Visit for screening mammogram    Morbid (severe) obesity due to excess calories (Multi) 01/04/2022    Class 2 severe obesity with serious comorbidity and body mass index (BMI) of 37.0 to 37.9 in adult, unspecified obesity type    Morbid (severe) obesity due to excess calories (Multi) 10/05/2021    Class 2 severe obesity with serious comorbidity and body mass index (BMI) of 36.0 to 36.9 in adult, unspecified obesity type    Personal history of other diseases of the digestive system     History of cholelithiasis       No past surgical history on file.     reports that she has never smoked. She has never used smokeless tobacco. She reports that she does not currently use  "alcohol. She reports that she does not use drugs.    Review of Systems  Pertinent systems reviewed and were negative unless otherwise stated in HPI.    Objective    Vitals:    09/15/24 1536   BP: 163/90   Pulse: 84   Resp: 16   Temp: 36.6 °C (97.8 °F)   SpO2: 98%   Weight: 83.9 kg (185 lb)   Height: 1.575 m (5' 2\")     No LMP recorded.    Physical Exam  Musculoskeletal:      Lumbar back: Normal.      Right hip: Normal.      Left hip: Tenderness (left gluteus socrates) present. No deformity, bony tenderness or crepitus. Decreased range of motion (reduced flexion).     === 09/15/24 ===    XR HIP LEFT WITH PELVIS WHEN PERFORMED 2 OR 3 VIEWS    - Impression -  Normal left hip.    Lumbar spine degenerative changes.    MACRO:  None    Signed by: Martinez Epstein 9/15/2024 4:46 PM  Dictation workstation:   [id number]    Diagnostic study results (if any) were reviewed by Harjeet Du PA-C.    Assessment/Plan   Allergies, medications, history, and pertinent labs/EKGs/imaging reviewed by Harjeet Du PA-C.     Medical Decision Making  LLE is NVI. Low concern for septic arthritis, acute fx or dislocation. Radicular pain without injury likely sciatica.     Orders and Diagnoses  Diagnoses and all orders for this visit:  Sciatica, left side  -     XR hip left with pelvis when performed 2 or 3 views; Future      Medical Admin Record      Disposition: Home    Electronically signed by Harjeet Du PA-C        "

## 2024-09-15 NOTE — PATIENT INSTRUCTIONS
Please follow up with your primary provider within one week if symptoms do not improve.  You may schedule an appointment online at [email or web address] or call [phone]. Go to the Emergency Department if symptoms significantly worsen     Xray did not show acute abnormalities

## 2024-09-19 ENCOUNTER — TELEMEDICINE (OUTPATIENT)
Dept: PRIMARY CARE | Facility: CLINIC | Age: 84
End: 2024-09-19
Payer: COMMERCIAL

## 2024-09-19 DIAGNOSIS — M54.42 ACUTE LOW BACK PAIN WITH BILATERAL SCIATICA, UNSPECIFIED BACK PAIN LATERALITY: Primary | ICD-10-CM

## 2024-09-19 DIAGNOSIS — M54.41 ACUTE LOW BACK PAIN WITH BILATERAL SCIATICA, UNSPECIFIED BACK PAIN LATERALITY: Primary | ICD-10-CM

## 2024-09-19 PROCEDURE — 99442 PR PHYS/QHP TELEPHONE EVALUATION 11-20 MIN: CPT | Performed by: INTERNAL MEDICINE

## 2024-09-19 RX ORDER — CYCLOBENZAPRINE HCL 10 MG
10 TABLET ORAL NIGHTLY PRN
Qty: 30 TABLET | Refills: 0 | Status: SHIPPED | OUTPATIENT
Start: 2024-09-19 | End: 2024-09-19

## 2024-09-19 RX ORDER — IBUPROFEN 600 MG/1
600 TABLET ORAL 3 TIMES DAILY PRN
Qty: 60 TABLET | Refills: 0 | Status: SHIPPED | OUTPATIENT
Start: 2024-09-19 | End: 2025-09-19

## 2024-09-19 RX ORDER — CYCLOBENZAPRINE HCL 10 MG
10 TABLET ORAL NIGHTLY PRN
Qty: 30 TABLET | Refills: 0 | Status: SHIPPED | OUTPATIENT
Start: 2024-09-19 | End: 2024-11-18

## 2024-09-19 ASSESSMENT — ENCOUNTER SYMPTOMS: BACK PAIN: 1

## 2024-09-19 NOTE — PROGRESS NOTES
Subjective   Patient ID: Makenna Black is a 84 y.o. female who presents for Back Pain.  Back Pain  This is a new problem. The current episode started in the past 7 days. The problem occurs constantly. The problem is unchanged. The pain is present in the gluteal and sacro-iliac. The quality of the pain is described as aching, shooting and stabbing. The pain is at a severity of 8/10. The pain is severe. The pain is Worse during the night. The symptoms are aggravated by lying down, sitting and position. Stiffness is present At night. She has tried analgesics, bed rest and NSAIDs for the symptoms. The treatment provided no relief.       Review of Systems   Musculoskeletal:  Positive for back pain.       Objective   Physical Exam  Neurological:      Mental Status: She is alert.   Psychiatric:         Mood and Affect: Mood normal.         Assessment/Plan   Problem List Items Addressed This Visit    None  Visit Diagnoses       Acute low back pain with bilateral sciatica, unspecified back pain laterality    -  Primary    Relevant Medications    cyclobenzaprine (Flexeril) 10 mg tablet    ibuprofen 600 mg tablet    Other Relevant Orders    Referral to Physical Therapy             Acute low back pain  Went to the urgent care, Xrays done, showed degenerative changes, given Prednisone, took it for 2 days, it help, but she went to see her rheumatologist 2 days later and was told to stop it, as it could interfere with her IA steroid injections.  He gave her Tramadol, she took it twice and it made her sick.  She is unable to sleep at night due to severe pain.  Denies tingling or numbness, denies weakness.  She is very sleepy, as she had not slept for few nights now.  Recommend heating pads, recommend Ibuprofen 600 mg TID, recommend Flexeril, start with lower dose, 5 mg and may increase to 10 mg if tolerates well, may cause sedation.  PT is recommended as well.    An interactive audio and video telecommunication system which permits  real time communications between the patient (at the originating site) and provider (at the distant site) was utilized to provide this telehealth service.    Verbal consent was requested and obtained from the patient on the day of encounter.  This is a virtual visit using HIPAA compliant video platform. It required patient-provider interaction for the medical decision making as documented.     I have communicated my name and active licensure. The patient's identity and physical location were verified at the time of this visit.   Either the patient or their legal representative has been informed of the risks and benefits of -- and alternatives to -- treatment through a remote evaluation and consents to proceed with the evaluation remotely.     Total time spent was 15 minutes for this encounter, including chart review, discussion with the patient and addressing their concerns.      Ana M Gongora MD

## 2024-09-24 DIAGNOSIS — E78.5 HYPERLIPIDEMIA, UNSPECIFIED HYPERLIPIDEMIA TYPE: ICD-10-CM

## 2024-09-24 DIAGNOSIS — I10 ESSENTIAL HYPERTENSION: ICD-10-CM

## 2024-09-24 RX ORDER — ATORVASTATIN CALCIUM 40 MG/1
40 TABLET, FILM COATED ORAL DAILY
Qty: 90 TABLET | Refills: 0 | Status: SHIPPED | OUTPATIENT
Start: 2024-09-24

## 2024-09-24 RX ORDER — ASPIRIN 81 MG/1
81 TABLET ORAL DAILY
Qty: 90 TABLET | Refills: 0 | Status: SHIPPED | OUTPATIENT
Start: 2024-09-24 | End: 2024-12-23

## 2024-10-01 DIAGNOSIS — E55.9 VITAMIN D DEFICIENCY: ICD-10-CM

## 2024-10-01 RX ORDER — ACETAMINOPHEN 500 MG
TABLET ORAL
Qty: 30 CAPSULE | Refills: 0 | Status: SHIPPED | OUTPATIENT
Start: 2024-10-01

## 2024-10-05 DIAGNOSIS — M54.42 ACUTE LOW BACK PAIN WITH BILATERAL SCIATICA, UNSPECIFIED BACK PAIN LATERALITY: ICD-10-CM

## 2024-10-05 DIAGNOSIS — M54.41 ACUTE LOW BACK PAIN WITH BILATERAL SCIATICA, UNSPECIFIED BACK PAIN LATERALITY: ICD-10-CM

## 2024-10-06 RX ORDER — IBUPROFEN 600 MG/1
600 TABLET ORAL 3 TIMES DAILY PRN
Qty: 60 TABLET | Refills: 0 | Status: SHIPPED | OUTPATIENT
Start: 2024-10-06 | End: 2025-10-06

## 2024-10-06 RX ORDER — CYCLOBENZAPRINE HCL 10 MG
10 TABLET ORAL NIGHTLY PRN
Qty: 30 TABLET | Refills: 0 | Status: SHIPPED | OUTPATIENT
Start: 2024-10-06 | End: 2024-12-05

## 2024-10-07 DIAGNOSIS — J40 BRONCHITIS: ICD-10-CM

## 2024-10-07 RX ORDER — MINERAL OIL
180 ENEMA (ML) RECTAL DAILY
Qty: 30 TABLET | Refills: 2 | Status: SHIPPED | OUTPATIENT
Start: 2024-10-07

## 2024-10-08 DIAGNOSIS — J44.1 COPD EXACERBATION (MULTI): ICD-10-CM

## 2024-10-09 ENCOUNTER — TELEPHONE (OUTPATIENT)
Dept: NEUROLOGY | Facility: HOSPITAL | Age: 84
End: 2024-10-09
Payer: COMMERCIAL

## 2024-10-09 NOTE — TELEPHONE ENCOUNTER
I was able to reach her daughter Reva this morning to discuss her recent brain/IAC MRI. Study was essentially unremarkable but neuroradiology read parietal lobe volume loss and mentioned CBD in differential. I discussed that to my eyes there are no parkinsonian features on her exam. CBD seems tangential to her clinical presentation. However, I did discuss option of a movement disorders evaluation if patient desires.

## 2024-10-25 ENCOUNTER — HOSPITAL ENCOUNTER (OUTPATIENT)
Dept: CARDIOLOGY | Facility: CLINIC | Age: 84
Discharge: HOME | End: 2024-10-25
Payer: COMMERCIAL

## 2024-10-25 DIAGNOSIS — I50.43 CHF (CONGESTIVE HEART FAILURE), NYHA CLASS I, ACUTE ON CHRONIC, COMBINED: ICD-10-CM

## 2024-10-25 DIAGNOSIS — I10 ESSENTIAL HYPERTENSION: ICD-10-CM

## 2024-10-25 LAB
AORTIC VALVE MEAN GRADIENT: 8.2 MMHG
AORTIC VALVE PEAK VELOCITY: 2.09 M/S
AV PEAK GRADIENT: 17.4 MMHG
AVA (PEAK VEL): 1.66 CM2
AVA (VTI): 1.64 CM2
EJECTION FRACTION APICAL 4 CHAMBER: 72.4
EJECTION FRACTION: 71 %
LEFT ATRIUM VOLUME AREA LENGTH INDEX BSA: 21.9 ML/M2
LEFT VENTRICLE INTERNAL DIMENSION DIASTOLE: 4.05 CM (ref 3.5–6)
LEFT VENTRICULAR OUTFLOW TRACT DIAMETER: 2.05 CM
MITRAL VALVE E/A RATIO: 0.7
RIGHT VENTRICLE FREE WALL PEAK S': 13 CM/S
RIGHT VENTRICLE PEAK SYSTOLIC PRESSURE: 31.6 MMHG
TRICUSPID ANNULAR PLANE SYSTOLIC EXCURSION: 2 CM

## 2024-10-25 PROCEDURE — 93306 TTE W/DOPPLER COMPLETE: CPT

## 2024-10-25 PROCEDURE — 93306 TTE W/DOPPLER COMPLETE: CPT | Performed by: INTERNAL MEDICINE

## 2024-10-28 DIAGNOSIS — E55.9 VITAMIN D DEFICIENCY: ICD-10-CM

## 2024-10-28 DIAGNOSIS — D64.9 ANEMIA, UNSPECIFIED TYPE: ICD-10-CM

## 2024-10-28 DIAGNOSIS — J84.10 PULMONARY FIBROSIS (MULTI): ICD-10-CM

## 2024-10-28 RX ORDER — ACETAMINOPHEN 500 MG
TABLET ORAL
Qty: 30 CAPSULE | Refills: 0 | Status: SHIPPED | OUTPATIENT
Start: 2024-10-28

## 2024-10-28 RX ORDER — MULTIVIT/IRON SULF/FOLIC ACID 15MG-0.4MG
1 TABLET ORAL DAILY
Qty: 30 TABLET | Refills: 2 | Status: SHIPPED | OUTPATIENT
Start: 2024-10-28

## 2024-10-28 RX ORDER — ALBUTEROL SULFATE 90 UG/1
1 INHALANT RESPIRATORY (INHALATION) EVERY 4 HOURS PRN
Qty: 8.5 G | Refills: 3 | Status: SHIPPED | OUTPATIENT
Start: 2024-10-28

## 2024-11-04 ENCOUNTER — EVALUATION (OUTPATIENT)
Dept: PHYSICAL THERAPY | Facility: CLINIC | Age: 84
End: 2024-11-04
Payer: COMMERCIAL

## 2024-11-04 DIAGNOSIS — R42 VERTIGO: ICD-10-CM

## 2024-11-04 DIAGNOSIS — M54.42 ACUTE LOW BACK PAIN WITH BILATERAL SCIATICA, UNSPECIFIED BACK PAIN LATERALITY: Primary | ICD-10-CM

## 2024-11-04 DIAGNOSIS — M54.41 ACUTE LOW BACK PAIN WITH BILATERAL SCIATICA, UNSPECIFIED BACK PAIN LATERALITY: Primary | ICD-10-CM

## 2024-11-04 DIAGNOSIS — E55.9 VITAMIN D DEFICIENCY: ICD-10-CM

## 2024-11-04 PROCEDURE — 97162 PT EVAL MOD COMPLEX 30 MIN: CPT | Mod: GP

## 2024-11-04 PROCEDURE — 97110 THERAPEUTIC EXERCISES: CPT | Mod: GP

## 2024-11-04 ASSESSMENT — PAIN - FUNCTIONAL ASSESSMENT: PAIN_FUNCTIONAL_ASSESSMENT: 0-10

## 2024-11-04 ASSESSMENT — PAIN SCALES - GENERAL: PAINLEVEL_OUTOF10: 3

## 2024-11-04 ASSESSMENT — ENCOUNTER SYMPTOMS
DEPRESSION: 1
OCCASIONAL FEELINGS OF UNSTEADINESS: 1

## 2024-11-04 NOTE — PROGRESS NOTES
Physical Therapy  Physical Therapy Orthopedic Evaluation    Patient Name: Makenna Black  MRN: [id number]  Today's Date: 11/4/2024  Time Calculation  Start Time: 1430  Stop Time: 1510  Time Calculation (min): 40 min  PT Evaluation Time Entry  PT Evaluation (Moderate) Time Entry: 20   PT Therapeutic Procedures Time Entry  Therapeutic Exercise Time Entry: 20          Insurance:  Visit number: 1 of [place]  Authorization info: no auth  Insurance Type: Payor: [employer] DUAL COMPLETE / Plan: [employer] DUAL COMPLETE / Product Type: *No Product type* /      Current Problem  1. Acute low back pain with bilateral sciatica, unspecified back pain laterality  Referral to Physical Therapy    Follow Up In Physical Therapy          Referred by: Dr. Gongora    General:  General  Reason for Referral: Chronic lower back pain w/ radiating pain  Referred By: Dr. Brito    Precautions:  Precautions  STEADI Fall Risk Score (The score of 4 or more indicates an increased risk of falling): 7  Precautions Comment: hypertension, asthma, OA, RA, thyroid disorder, depression, anxiety  Medical History Form: Reviewed (scanned into chart)    Subjective:   Subjective   Chief Complaint: Patient presents w/ chronic lower back pain w/ radiating pain into her LLE. Here w/ a  who is able to translate for her as she speaks little English. Pain began around 9/12/2024, has received two injections which have significantly improved her pain. Was a 10/10 before 1st injection, is now a 3/10. Notices it w/ walking, standing and stairs.   Onset: 9/12/2024  GILDA: chronic/insidious    Current Condition:   Better    Pain:  Pain Assessment: 0-10  0-10 (Numeric) Pain Score: 3  Location: LLE and glute  Description: shooting  Aggravating Factors:  Standing, Walking, and Stairs  Relieving Factors:  Rest    Relevant Information (PMH & Previous Tests/Imaging): COPD, asthma, hypertension, OA, RA, thyroid disorder, depression, anxiety.     Previous Interventions/Treatments: Dry Needling and Injections both helpful     Prior Level of Function (PLOF)  Patient previously independent with all ADLs  Exercise/Physical Activity: rehab exercise routine she took   Work/School: unemployed    Patients Living Environment: Reviewed and no concern  Primary Language: English  Patient's Goal(s) for Therapy: Decrease pain    Red Flags: Do you have any of the following? No  Fever/chills, unexplained weight changes, dizziness/fainting, unexplained change in bowel or bladder functions, unexplained malaise or muscle weakness, night pain/sweats, numbness or tingling    Objective:    Thoracic AROM (Degrees)    Flexion: wfl  Extension: wfl  (L) Side Bend: wfl  (R) Side Bend: wfl  (L) Rotation: wfl  (R) Rotation: wfl      Lumbar AROM (Degrees)    Flexion: wfl  Repeated Flexion: produces  RFIL: NT  Extension: wfl + pain free  Repeated Extensions: feels like a good stretch  REIL: NT  (L) Side Bend: wfl  (R) Side Bend: wfl  (L) Rotation: wfl  (R) Rotation: wfl    Hip AROM (Degrees)    (R)  (L)  Flexion: wfl  wfl   Extension: wfl  wfl    Abduction: wfl  wfl    Adduction: wfl  wfl    ER:  wfl  wfl    IR:  wfl  Wfl      Hip PROM (Degrees)    (R)  (L)  Flexion: wfl  wfl     Extension: wfl  wfl    Abduction: wfl  wfl    Adduction: wfl  wfl    ER:  wfl  wfl    IR:  wfl  Wfl        Strength Testing  [facility]dominals: [facility]  Hip    (R)  (L)  Flexion: 4  4     Extension: 3+  3+    Abduction: 3*  3*    Adduction: 3*  3*    ER:  4+  4+    IR:  5  5      Joint Mobility  NT today, NV     Special Tests  Slump: pos due to pain production  SLR: pos due to pain production  FADDIR: NT  AMBROSE: pos due to hips not reaching parallel B    SI cluster ([place], thigh thrust, sacral thrust, compression, distraction)  NT    Outcome Measures:  Other Measures  Oswestry Disablity Index (CARMEN): 30/50 60% (modified)     Treatments:    There-ex: 20'  Hooklying 90/90   Hip adductor isometrics w/  "5-6\" holds 2x10*  Hook-lying clamshells w/ black TB 2x20*  Seated sciatic nerve glides 2x10*  Heel raises w/ counter support x20*  Lumbar extensions w/ counter support x10  Semi-tandem stance cross body reaches to counter x10 per side*    * = added to HEP.  Sheet with exercise descriptions and images issued.  Skilled intervention utilized in the appropriate selection & application of above exercises.  Verbal and tactile cues provided for proper form and technique.  Pt. demonstrated appropriate form & verbalized understanding of optimal technique for above exercises.    [email or web address]    Access Code: [email or web address]    EDUCATION: Home exercise program, plan of care, activity modifications, pain management, and injury pathology       Assessment: Patient presents with signs and symptoms consistent with chronic lower back pain with radiating pain, resulting in limited participation in pain-free ADLs and inability to perform at their prior level of function. Pt would benefit from physical therapy to address the impairments found & listed previously in the objective section in order to return to safe and pain-free ADLs and prior level of function.     Evolving with changing characteristics  Depression, Anxiety, and Poor family support, language barrier    Plan:  PT Plan: Skilled PT  PT Frequency: 1 time per week  Duration: 6-8 weeks  Onset Date: 11/04/24  Certification Period Start Date: 11/04/24  Certification Period End Date: 02/02/25  Rehab Potential: Fair (due to current clinical presentation and comorbidities)  Plan of Care Agreement: Patient, Other (comment) ()  Planned Interventions include: therapeutic exercise, self-care home management, manual therapy, therapeutic activities, gait training, neuromuscular coordination, vasopneumatic, dry needling, aquatic therapy  Goals: Set and discussed today  Active       Chronic LBP w/ radiating pain       STGs       Start:  11/04/24    " Expected End:  12/09/24       1. Patient will demonstrate independence w/ HEP to allow for self-management of symptoms   2. Patient will demonstrate increased strength on the Sahrmann scale for core strength to level 1.5 to progress towards LTG and decrease LB stain.   3. Patient will demonstrate improved lumbar flexion ROM to pain free to demonstrate an improved tolerance to load.   4. Patient will report a decrease in pain by at least 2 points to meet the MCID    5. Patient will tolerate walking for 1/2 mile w/ a reported decrease in symptoms to progress towards long term goals  6. Patient will improve gross hip strength to at least 4+/5 to improve ADLs and gait         LTGs       Start:  11/04/24    Expected End:  01/13/25       1. Patient will report a decrease in pain w/ stair negotiation by at least 2 points to meet the MDC and improve performance of ADLs.   2. Patient will be able to tolerate standing for more than 1 hour w/o a return or increase in familiar pain to demonstrate an improved tolerance to functional mobility.   3. Patient will report an improved score on modified CARMEN by at least 10 points to meet the MDC   4. Patient will demonstrate improved strength during Sahrmann core strength testing to level 2 to decrease LB strain and improve ADL performance.   5. Patient will tolerate walking for more than 1 mile w/o a return of pain to demonstrate an improved tolerance to load.   6. Patient will demonstrate and improved activity tolerance by having negative SLR and slump tests to allow improved functional mobility and demonstrate decreased neural tension.              Screening  Frequency  Date Last Completed   Spiritual and Cultural Beliefs   Screening  each visit or episode of care    Falls Risk Screening  every ambulatory visit [unfilled]   Pain Screening  annually at primary care visit     Domestic Violence screening  annually at primary care visit    Elder Abuse Screening  annually at primary  care visit    Depression Screening  annually in the primary care setting 3/19/2024   Suicide Risk Screening  annually in the primary care setting    Nutrition and Food Insecurity   Screening  at least annually at primary care visit     Key Learner  annually in the primary care setting    Drug Screen  3/19/2024 11:32 AM   Alcohol Screen  3/19/2024 11:32 AM   Advance Directive           Plan of care was developed with input and agreement by the patient      Antonio Alvarez, PT, ATC

## 2024-11-05 RX ORDER — MULTIVITAMIN
2 TABLET ORAL DAILY
Qty: 60 TABLET | Refills: 2 | Status: SHIPPED | OUTPATIENT
Start: 2024-11-05

## 2024-11-11 ENCOUNTER — HOME VISIT (OUTPATIENT)
Dept: PRIMARY CARE | Facility: CLINIC | Age: 84
End: 2024-11-11
Payer: COMMERCIAL

## 2024-11-11 DIAGNOSIS — J44.9 CHRONIC OBSTRUCTIVE PULMONARY DISEASE, UNSPECIFIED COPD TYPE (MULTI): Primary | ICD-10-CM

## 2024-11-11 DIAGNOSIS — F32.A DEPRESSION, UNSPECIFIED DEPRESSION TYPE: ICD-10-CM

## 2024-11-11 DIAGNOSIS — E06.3 HYPOTHYROIDISM DUE TO HASHIMOTO'S THYROIDITIS: ICD-10-CM

## 2024-11-11 PROCEDURE — 99375 HOME HEALTH CARE SUPERVISION: CPT | Performed by: INTERNAL MEDICINE

## 2024-11-11 RX ORDER — LEVOTHYROXINE SODIUM 100 UG/1
TABLET ORAL
Qty: 180 TABLET | Refills: 0 | Status: SHIPPED | OUTPATIENT
Start: 2024-11-11

## 2024-11-11 NOTE — TELEPHONE ENCOUNTER
Calling states eloina at [facility] changed levothyroxine to 100 mcg 1.5 tablets 3 days a week and 1 tablets 4 days a week.    Needs a refill on meds.

## 2024-11-26 DIAGNOSIS — E55.9 VITAMIN D DEFICIENCY: ICD-10-CM

## 2024-11-27 RX ORDER — ACETAMINOPHEN 500 MG
TABLET ORAL
Qty: 30 CAPSULE | Refills: 0 | Status: SHIPPED | OUTPATIENT
Start: 2024-11-27

## 2024-11-29 ENCOUNTER — HOSPITAL ENCOUNTER (OUTPATIENT)
Dept: RADIOLOGY | Facility: CLINIC | Age: 84
Discharge: HOME | End: 2024-11-29
Payer: COMMERCIAL

## 2024-11-29 DIAGNOSIS — Z13.820 SCREENING FOR OSTEOPOROSIS: ICD-10-CM

## 2024-11-29 DIAGNOSIS — M81.8 ADULT IDIOPATHIC GENERALIZED OSTEOPOROSIS: ICD-10-CM

## 2024-11-29 PROCEDURE — 77080 DXA BONE DENSITY AXIAL: CPT

## 2024-12-03 DIAGNOSIS — G25.0 ESSENTIAL TREMOR: ICD-10-CM

## 2024-12-03 DIAGNOSIS — J40 BRONCHITIS: ICD-10-CM

## 2024-12-03 DIAGNOSIS — E06.3 HYPOTHYROIDISM DUE TO HASHIMOTO'S THYROIDITIS: ICD-10-CM

## 2024-12-03 RX ORDER — LEVOTHYROXINE SODIUM 100 UG/1
TABLET ORAL
Qty: 90 TABLET | Refills: 0 | Status: SHIPPED | OUTPATIENT
Start: 2024-12-03

## 2024-12-03 RX ORDER — FLUTICASONE PROPIONATE 50 MCG
1 SPRAY, SUSPENSION (ML) NASAL DAILY
Qty: 16 G | Refills: 2 | Status: SHIPPED | OUTPATIENT
Start: 2024-12-03 | End: 2025-12-03

## 2024-12-03 RX ORDER — PROPRANOLOL HYDROCHLORIDE 10 MG/1
10 TABLET ORAL 2 TIMES DAILY
Qty: 60 TABLET | Refills: 2 | Status: SHIPPED | OUTPATIENT
Start: 2024-12-03 | End: 2025-03-03

## 2024-12-17 ENCOUNTER — APPOINTMENT (OUTPATIENT)
Dept: PRIMARY CARE | Facility: CLINIC | Age: 84
End: 2024-12-17
Payer: COMMERCIAL

## 2024-12-17 VITALS — BODY MASS INDEX: 34.57 KG/M2 | DIASTOLIC BLOOD PRESSURE: 80 MMHG | SYSTOLIC BLOOD PRESSURE: 142 MMHG | WEIGHT: 189 LBS

## 2024-12-17 DIAGNOSIS — M06.9 RHEUMATOID ARTHRITIS, INVOLVING UNSPECIFIED SITE, UNSPECIFIED WHETHER RHEUMATOID FACTOR PRESENT (MULTI): Primary | ICD-10-CM

## 2024-12-17 DIAGNOSIS — I10 ESSENTIAL HYPERTENSION: ICD-10-CM

## 2024-12-17 DIAGNOSIS — E06.3 HYPOTHYROIDISM DUE TO HASHIMOTO'S THYROIDITIS: ICD-10-CM

## 2024-12-17 DIAGNOSIS — R42 VERTIGO: ICD-10-CM

## 2024-12-17 DIAGNOSIS — E55.9 VITAMIN D DEFICIENCY: ICD-10-CM

## 2024-12-17 DIAGNOSIS — R73.01 IFG (IMPAIRED FASTING GLUCOSE): ICD-10-CM

## 2024-12-17 DIAGNOSIS — M81.8 ADULT IDIOPATHIC GENERALIZED OSTEOPOROSIS: ICD-10-CM

## 2024-12-17 DIAGNOSIS — J84.10 PULMONARY FIBROSIS (MULTI): ICD-10-CM

## 2024-12-17 PROCEDURE — 99214 OFFICE O/P EST MOD 30 MIN: CPT | Performed by: INTERNAL MEDICINE

## 2024-12-17 PROCEDURE — 3077F SYST BP >= 140 MM HG: CPT | Performed by: INTERNAL MEDICINE

## 2024-12-17 PROCEDURE — 3079F DIAST BP 80-89 MM HG: CPT | Performed by: INTERNAL MEDICINE

## 2024-12-17 PROCEDURE — G2211 COMPLEX E/M VISIT ADD ON: HCPCS | Performed by: INTERNAL MEDICINE

## 2024-12-17 RX ORDER — CALCIUM CARBONATE/VITAMIN D3 600MG-5MCG
1 TABLET ORAL 2 TIMES DAILY
Qty: 60 TABLET | Refills: 1 | Status: SHIPPED | OUTPATIENT
Start: 2024-12-17 | End: 2025-02-15

## 2024-12-17 RX ORDER — FLUTICASONE PROPIONATE 50 MCG
1 SPRAY, SUSPENSION (ML) NASAL DAILY
Qty: 16 G | Refills: 1 | Status: SHIPPED | OUTPATIENT
Start: 2024-12-17 | End: 2025-02-15

## 2024-12-17 RX ORDER — MULTIVITAMIN
2 TABLET ORAL DAILY
Qty: 60 TABLET | Refills: 2 | Status: SHIPPED | OUTPATIENT
Start: 2024-12-17

## 2024-12-17 ASSESSMENT — PATIENT HEALTH QUESTIONNAIRE - PHQ9
SUM OF ALL RESPONSES TO PHQ9 QUESTIONS 1 AND 2: 2
10. IF YOU CHECKED OFF ANY PROBLEMS, HOW DIFFICULT HAVE THESE PROBLEMS MADE IT FOR YOU TO DO YOUR WORK, TAKE CARE OF THINGS AT HOME, OR GET ALONG WITH OTHER PEOPLE: VERY DIFFICULT
1. LITTLE INTEREST OR PLEASURE IN DOING THINGS: SEVERAL DAYS
2. FEELING DOWN, DEPRESSED OR HOPELESS: SEVERAL DAYS

## 2024-12-17 ASSESSMENT — ENCOUNTER SYMPTOMS
DEPRESSION: 0
OCCASIONAL FEELINGS OF UNSTEADINESS: 1
LOSS OF SENSATION IN FEET: 1

## 2024-12-17 NOTE — PROGRESS NOTES
Crow Black is a 84 y.o. female presenting for routine follow up. She has seen her pulmonologist as well as a neurologist since her last primary care visit. The pulmonologist did not recommend trelegy, and the patient was continued on fluticasone/salmeterol twice daily and atorvent (ideally PRN rescue) but states that she has been using both twice daily. No new URI or worsening of symptoms. She has a tremor for which she presented to neurology. An evaluation was done, including MRI, which was not consistent with parkinsonism. No other endorsed concerns.    Objective    Physical Exam  Constitutional:       Appearance: Normal appearance.   HENT:      Head: Normocephalic and atraumatic.      Nose: Nose normal.      Mouth/Throat:      Mouth: Mucous membranes are moist.      Pharynx: Oropharynx is clear.   Eyes:      Extraocular Movements: Extraocular movements intact.      Pupils: Pupils are equal, round, and reactive to light.   Cardiovascular:      Rate and Rhythm: Normal rate and regular rhythm.      Heart sounds: Normal heart sounds.   Pulmonary:      Effort: Pulmonary effort is normal.      Breath sounds: Normal breath sounds. No wheezing.   Abdominal:      General: Abdomen is flat. Bowel sounds are normal.      Palpations: Abdomen is soft.   Musculoskeletal:         General: Normal range of motion.      Right lower leg: No edema.      Left lower leg: No edema.   Skin:     General: Skin is warm and dry.      Capillary Refill: Capillary refill takes less than 2 seconds.   Neurological:      General: No focal deficit present.      Mental Status: She is alert and oriented to person, place, and time.     BP: (142)/(80) 142/80  Vitals:    12/17/24 0948   Weight: 85.7 kg (189 lb)     Labs:   CBC:  Recent Labs     09/03/24  1058 01/31/24  1412 08/14/23  1047   WBC 6.1 7.9 6.7   HGB 14.5 13.9 14.1   HCT 44.5 43.5 44.7    327 243   MCV 94 92 94     CMP:  Recent Labs     09/03/24  1058 01/31/24  1412  08/14/23  1047    142 143   K 4.7 4.3 4.4    104 104   CO2 29 28 29   ANIONGAP 14 14 14   BUN 16 10 10   CREATININE 0.68 0.58 0.68   EGFR 87 90  --    GLUCOSE 101* 88 96     Recent Labs     09/03/24  1058 01/31/24  1412 08/14/23  1047   ALBUMIN 4.5 4.2 4.4   ALKPHOS 42 47 43   ALT 27 26 21   AST 20 19 21   BILITOT 0.4 0.3 0.4     Calcium/Phos:   Lab Results   Component Value Date    CALCIUM 9.5 09/03/2024      ENDO:  Recent Labs     09/03/24  1058 01/31/24  1412 08/14/23  1047 01/17/23  1207   TSH 0.90 0.59 0.50 0.63   HGBA1C 5.6 5.7* 5.4 5.6      CARDIAC:   Recent Labs     09/03/24  1058 01/31/24  1412 08/14/23  1047 10/11/22  1054 04/08/22  1128   CHOL 246* 316* 247* 234* 301*   LDLF  --   --  139* 130* 195*   LDLCALC 151* 211  --   --   --    HDL 75.7 71.4 77.3 80.4 76.0   TRIG 98 166* 152* 119 148     Imaging:  XR DEXA bone density  Result Date: 12/2/2024  DEXA:  According to World Health Organization criteria, classification is normal.   Followup recommended in 2 years or sooner as clinically warranted.   All images and detailed analysis are available on the  Radiology PACS.    Transthoracic Echo (TTE) Complete  10/25/2024  CONCLUSIONS:  1. Left ventricular ejection fraction is normal, calculated by Sebastian's biplane at 71%.  2. Spectral Doppler shows an impaired relaxation pattern of left ventricular diastolic filling.  3. There is normal right ventricular global systolic function.  4. Mildly elevated right ventricular systolic pressure.  5. Mild aortic valve stenosis.  6. Compared with study dated 8/26/2015, progression of aortic valve sclerosis to mild stenosis. No other changes.    Meds:  Current Outpatient Medications   Medication Instructions    albuterol 90 mcg/actuation inhaler 1 puff, inhalation, Every 4 hours PRN    albuterol 2.5 mg, nebulization, Every 6 hours PRN    aspirin 81 mg, oral, Daily, with food    atorvastatin (LIPITOR) 40 mg, oral, Daily    blood pressure monitor kit 1 each,  miscellaneous, Daily    calcium carbonate-vitamin D3 600 mg-10 mcg (400 unit) tablet 2 tablets, oral, Daily    calcium carbonate-vitamin D3 600 mg-5 mcg (200 unit) tablet 1 tablet, oral, 2 times daily    cholecalciferol (Vitamin D-3) 50 mcg (2,000 unit) capsule TAKE ONE CAPSULE DAILY    ciprofloxacin (Cetraxal) 0.2 % otic solution 0.25 mL, otic (ear), 4 times daily    cyclobenzaprine (FLEXERIL) 10 mg, oral, Nightly PRN    diclofenac sodium 1 % kit 1 each, transdermal    diclofenac sodium 3 % gel 1 Application, Topical, 2 times daily PRN    docusate sodium (COLACE) 100 mg, oral, 2 times daily    DULoxetine (CYMBALTA) 30 mg, oral, Daily    FeroSuL tablet 1 tablet, oral, Daily    fexofenadine (ALLEGRA) 180 mg, oral, Daily    fluticasone (Flonase) 50 mcg/actuation nasal spray 1 spray, Each Nostril, Daily    fluticasone (Flonase) 50 mcg/actuation nasal spray 1 spray, Each Nostril, Daily, Shake gently. Before first use, prime pump. After use, clean tip and replace cap.    fluticasone propion-salmeteroL (Advair Diskus) 250-50 mcg/dose diskus inhaler 1 puff, inhalation    folic acid (FOLVITE) 1 mg, oral, Daily    glucosamine armas 2KCl-chondroit 750-600 mg tablet 1 each, oral    ibuprofen 600 mg, oral, 3 times daily PRN    ipratropium (Atrovent HFA) 17 mcg/actuation inhaler 1 puff, inhalation, 4 times daily    ketorolac (Acular) 0.5 % ophthalmic solution Both Eyes, 4 times daily    levomilnacipranR (FETZIMA) 40 mg, oral, Daily    levothyroxine (Synthroid, Levoxyl) 100 mcg tablet TAKE 1 TABLET (100 MCG) BY MOUTH ONCE DAILY.    loratadine (CLARITIN) 10 mg, oral, Daily    meclizine (Antivert) 25 mg tablet TAKE 1 TABLET 3 TIMES DAILY AS NEEDED FOR DIZZINESS    methotrexate (TREXALL) 2.5 mg, oral, Once Weekly    nebivolol (BYSTOLIC) 10 mg, oral, Daily    olopatadine (Patanol) 0.1 % ophthalmic solution 1 drop, Both Eyes, 2 times daily    omeprazole (PRILOSEC) 20 mg, oral, Daily    polyvinyl alcohol (Liquifilm Tears) 1.4 % ophthalmic  solution 1 drop, Both Eyes, As needed    predniSONE (DELTASONE) 10 mg, oral, Daily    predniSONE (DELTASONE) 20 mg, oral, Daily    propranolol (INDERAL) 10 mg, oral, 2 times daily    Tab-A-Christel Multivitamin w-iron 15 mg iron- 400 mcg tablet 1 tablet, oral, Daily    VITAMIN A ORAL 1 tablet, oral, Daily     Assessment    Makenna Black is a 84 y.o. female with a history of HTN, HLD, hypothyroidism, essential tremor, OA, RA complicated by pulmonary nodules and suspected pulmonary fibrosis. There were concerns today over her inhaler use, appears that she is taking her rescue inhaler twice daily along with her maintenance inhaler. Suspect that she has essential tremor vs beta agonist induced tremor. Over all stable, anticipate significant functional decline 2/2 URI some time this winter. Routinely refuses flu and RSV vaccination despite recommendations. Not taking statin regularly for unclear reasons.    # HTN  -Today at the office was 142/80  - Propranolol 20mg     # Essential tremor  - Follows with neurology  - Continue propranolol 20mg     # HLD  - Continue atorvastatin (takes irregularly)     # Pre-diabetic  - Was advised to reduce carbohydrates consumption  - Monitor A1c q6-12 mo     #Hypothyroidism  - She takes levothyroxine 100 mcg as prescribed     #RA   #osteoarthritis  - Methotrexate 2.5 mg and hydroxychloroquine  - Continue diclofenac sodium 75mg and start doclofenac sodium gel as needed  - Continue [facility] for acupuncture     #Insomnia  - Melatonin 5 mg      # Dyspnea on exertion  :: Echo suggests diastolic impairment  - Likely 2/2 pulmonary process     # Pulmonary fibrosis  # Asthma  # Stable pulmonary nodules 2/2 RA  :: Follows with pulm at [facility] who recommended fluticasone/fometerol with PRN atorvent  - Unclear why trelegy is not being recommended     # Hearing loss  - She was not using hearing aids at the office  - She continues to use them on both sides      # Vertigo  # Imbalance  - Meclazine  - PT  referral     # Routine health maintenance  - Declined flu shot  - Urine albumin, Vitiamin D, CBC, and A1c, lipids ordered due 9/2025  - CMP, A1c due 3/2025  - RSV recommended    Casa Gamble MD MS  PGY2 Internal Medicine

## 2024-12-18 ENCOUNTER — APPOINTMENT (OUTPATIENT)
Dept: PRIMARY CARE | Facility: CLINIC | Age: 84
End: 2024-12-18
Payer: COMMERCIAL

## 2024-12-18 ENCOUNTER — APPOINTMENT (OUTPATIENT)
Dept: PHYSICAL THERAPY | Facility: CLINIC | Age: 84
End: 2024-12-18
Payer: COMMERCIAL

## 2024-12-23 ENCOUNTER — TELEMEDICINE (OUTPATIENT)
Dept: PRIMARY CARE | Facility: CLINIC | Age: 84
End: 2024-12-23
Payer: COMMERCIAL

## 2024-12-23 DIAGNOSIS — J40 BRONCHITIS: Primary | ICD-10-CM

## 2024-12-23 PROCEDURE — 99442 PR PHYS/QHP TELEPHONE EVALUATION 11-20 MIN: CPT | Performed by: INTERNAL MEDICINE

## 2024-12-23 RX ORDER — BENZONATATE 200 MG/1
200 CAPSULE ORAL 3 TIMES DAILY PRN
Qty: 42 CAPSULE | Refills: 0 | Status: SHIPPED | OUTPATIENT
Start: 2024-12-23 | End: 2025-01-22

## 2024-12-23 RX ORDER — DOXYCYCLINE 100 MG/1
100 CAPSULE ORAL 2 TIMES DAILY
Qty: 14 CAPSULE | Refills: 0 | Status: SHIPPED | OUTPATIENT
Start: 2024-12-23 | End: 2024-12-23

## 2024-12-23 RX ORDER — PREDNISONE 20 MG/1
40 TABLET ORAL DAILY
Qty: 10 TABLET | Refills: 0 | Status: SHIPPED | OUTPATIENT
Start: 2024-12-23 | End: 2024-12-28

## 2024-12-23 RX ORDER — DOXYCYCLINE 100 MG/1
100 CAPSULE ORAL 2 TIMES DAILY
Qty: 14 CAPSULE | Refills: 0 | Status: SHIPPED | OUTPATIENT
Start: 2024-12-23 | End: 2024-12-30

## 2024-12-23 ASSESSMENT — ENCOUNTER SYMPTOMS
COUGH: 1
FEVER: 0
CHILLS: 0
MYALGIAS: 0
WHEEZING: 1
SHORTNESS OF BREATH: 0
HEMOPTYSIS: 0

## 2024-12-23 ASSESSMENT — COPD QUESTIONNAIRES: COPD: 1

## 2024-12-23 NOTE — PROGRESS NOTES
Chief Complaint:   Chief Complaint   Patient presents with    Bronchitis        Makenna Black is a 84 y.o. year old female who presents to the clinic for a virtual visit.  Cough  This is a new problem. The current episode started in the past 7 days. The problem has been gradually worsening. The cough is Productive of purulent sputum. Associated symptoms include chest pain, postnasal drip and wheezing. Pertinent negatives include no chills, ear congestion, ear pain, fever, hemoptysis, myalgias, nasal congestion or shortness of breath. Her past medical history is significant for bronchiectasis and COPD.        Past Medical History   Past Medical History:   Diagnosis Date    Body mass index (BMI) 34.0-34.9, adult 08/02/2019    Body mass index (BMI) of 34.0 to 34.9 in adult    Body mass index (BMI) 36.0-36.9, adult 10/05/2021    BMI 36.0-36.9,adult    Body mass index (BMI) 37.0-37.9, adult 07/06/2021    Body mass index (BMI) of 37.0 to 37.9 in adult    Body mass index (BMI) 37.0-37.9, adult 01/04/2022    Body mass index (BMI) of 37.0 to 37.9 in adult    Encounter for screening mammogram for malignant neoplasm of breast     Visit for screening mammogram    Morbid (severe) obesity due to excess calories (Multi) 01/04/2022    Class 2 severe obesity with serious comorbidity and body mass index (BMI) of 37.0 to 37.9 in adult, unspecified obesity type    Morbid (severe) obesity due to excess calories (Multi) 10/05/2021    Class 2 severe obesity with serious comorbidity and body mass index (BMI) of 36.0 to 36.9 in adult, unspecified obesity type    Personal history of other diseases of the digestive system     History of cholelithiasis      Past Surgical History:   No past surgical history on file.  Family History:   No family history on file.  Social History:   Tobacco Use: Low Risk  (8/21/2024)    Patient History     Smoking Tobacco Use: Never     Smokeless Tobacco Use: Never     Passive Exposure: Not on file      Social History  "    Substance and Sexual Activity   Alcohol Use Not Currently        Allergies:   Allergies   Allergen Reactions    Lisinopril Cough        ROS   Review of Systems   Constitutional:  Negative for chills and fever.   HENT:  Positive for postnasal drip. Negative for ear pain.    Respiratory:  Positive for cough and wheezing. Negative for hemoptysis and shortness of breath.    Cardiovascular:  Positive for chest pain.   Musculoskeletal:  Negative for myalgias.        Objective   There were no vitals filed for this visit.   BMI Readings from Last 15 Encounters:   12/17/24 34.57 kg/m²   09/15/24 33.84 kg/m²   09/10/24 36.36 kg/m²   08/21/24 36.52 kg/m²   03/19/24 36.52 kg/m²   01/31/24 36.91 kg/m²   12/01/23 36.91 kg/m²   08/25/23 36.33 kg/m²   08/14/23 36.33 kg/m²   01/17/23 37.69 kg/m²   10/11/22 36.13 kg/m²   08/30/22 35.94 kg/m²   07/12/22 35.94 kg/m²   05/19/22 37.89 kg/m²      85.7 kg (189 lb) (12/17/2024  9:48 AM)      Physical Exam  Physical Exam  Neurological:      Mental Status: She is alert.   Psychiatric:         Mood and Affect: Mood normal.          Labs:  CBC:  Recent Labs     09/03/24  1058 01/31/24  1412 08/14/23  1047   WBC 6.1 7.9 6.7   HGB 14.5 13.9 14.1   HCT 44.5 43.5 44.7    327 243   MCV 94 92 94     CMP:  Recent Labs     09/03/24  1058 01/31/24  1412 08/14/23  1047    142 143   K 4.7 4.3 4.4    104 104   CO2 29 28 29   ANIONGAP 14 14 14   BUN 16 10 10   CREATININE 0.68 0.58 0.68   EGFR 87 90  --    GLUCOSE 101* 88 96     Recent Labs     09/03/24  1058 01/31/24  1412 08/14/23  1047   ALBUMIN 4.5 4.2 4.4   ALKPHOS 42 47 43   ALT 27 26 21   AST 20 19 21   BILITOT 0.4 0.3 0.4     Calcium/Phos:   Lab Results   Component Value Date    CALCIUM 9.5 09/03/2024      COAG: No results for input(s): \"INR\", \"DDIMERVTE\" in the last 98637 hours.  CRP:   Lab Results   Component Value Date    CRP 1.19 (A) 01/20/2021      [unfilled]   ENDO:  Recent Labs     09/03/24  1058 01/31/24  1412 " "08/14/23  1047 01/17/23  1207   TSH 0.90 0.59 0.50 0.63   HGBA1C 5.6 5.7* 5.4 5.6      CARDIAC: No results for input(s): \"LDH\", \"CKMB\", \"TROPHS\", \"BNP\" in the last 80490 hours.    No lab exists for component: \"CK\", \"CKMBP\"  Recent Labs     09/03/24  1058 01/31/24  1412 08/14/23  1047 10/11/22  1054 04/08/22  1128   CHOL 246* 316* 247* 234* 301*   LDLF  --   --  139* 130* 195*   LDLCALC 151* 211  --   --   --    HDL 75.7 71.4 77.3 80.4 76.0   TRIG 98 166* 152* 119 148     No data recorded    Current Medications:  Current Outpatient Medications   Medication Sig Dispense Refill    aspirin 81 mg EC tablet TAKE 1 TABLET (81 MG) BY MOUTH ONCE DAILY. WITH FOOD 90 tablet 0    atorvastatin (Lipitor) 40 mg tablet TAKE 1 TABLET (40 MG) BY MOUTH ONCE DAILY. 90 tablet 0    benzonatate (Tessalon) 200 mg capsule Take 1 capsule (200 mg) by mouth 3 times a day as needed for cough. Do not crush or chew. 42 capsule 0    blood pressure monitor kit 1 each once daily. 1 kit 0    calcium carbonate-vitamin D3 600 mg-10 mcg (400 unit) tablet Take 2 tablets by mouth once daily. 60 tablet 2    calcium carbonate-vitamin D3 600 mg-5 mcg (200 unit) tablet Take 1 tablet by mouth 2 times a day. 60 tablet 1    cholecalciferol (Vitamin D-3) 50 mcg (2,000 unit) capsule TAKE ONE CAPSULE DAILY 30 capsule 0    ciprofloxacin (Cetraxal) 0.2 % otic solution Administer 0.25 mL into affected ear(s) in the morning and 0.25 mL at noon and 0.25 mL in the evening and 0.25 mL before bedtime.      diclofenac sodium 1 % kit Place 1 each on the skin.      diclofenac sodium 3 % gel Apply 1 Application topically 2 times a day as needed (Pain). 100 g 2    docusate sodium (Colace) 100 mg capsule Take 1 capsule (100 mg) by mouth in the morning and 1 capsule (100 mg) before bedtime.      doxycycline (Vibramycin) 100 mg capsule Take 1 capsule (100 mg) by mouth 2 times a day for 7 days. Take with at least 8 ounces (large glass) of water, do not lie down for 30 minutes after " 14 capsule 0    DULoxetine (Cymbalta) 30 mg DR capsule Take 1 capsule (30 mg) by mouth once daily. 90 capsule 0    FeroSuL tablet TAKE 1 TABLET BY MOUTH ONCE DAILY. 30 tablet 2    fexofenadine (Allegra) 180 mg tablet TAKE 1 TABLET (180 MG) BY MOUTH ONCE DAILY. 30 tablet 2    fluticasone (Flonase) 50 mcg/actuation nasal spray Administer 1 spray into each nostril once daily. 16 g 1    fluticasone propion-salmeteroL (Advair Diskus) 250-50 mcg/dose diskus inhaler Inhale 1 puff.      folic acid (Folvite) 1 mg tablet Take 1 tablet (1 mg) by mouth once daily. 30 tablet 2    glucosamine armas 2KCl-chondroit 750-600 mg tablet Take 1 each by mouth.      ibuprofen 600 mg tablet TAKE 1 TABLET (600 MG) BY MOUTH 3 TIMES A DAY AS NEEDED FOR MILD PAIN (1 - 3) (PAIN). 60 tablet 0    ipratropium (Atrovent HFA) 17 mcg/actuation inhaler Inhale 1 puff  in the morning and 1 puff at noon and 1 puff in the evening and 1 puff before bedtime.      ketorolac (Acular) 0.5 % ophthalmic solution Administer into both eyes 4 times a day.      levomilnacipranR (Fetzima) 40 mg extended release capsule Take 1 capsule (40 mg) by mouth once daily. 30 capsule 2    levothyroxine (Synthroid, Levoxyl) 100 mcg tablet TAKE 1 TABLET (100 MCG) BY MOUTH ONCE DAILY. 90 tablet 0    loratadine (Claritin) 10 mg tablet Take 1 tablet (10 mg) by mouth once daily. 90 tablet 0    meclizine (Antivert) 25 mg tablet TAKE 1 TABLET 3 TIMES DAILY AS NEEDED FOR DIZZINESS 90 tablet 0    methotrexate (Trexall) 2.5 mg tablet Take 1 tablet (2.5 mg total) by mouth 1 (one) time per week.      nebivolol (Bystolic) 10 mg tablet Take 1 tablet (10 mg) by mouth once daily. 90 tablet 0    olopatadine (Patanol) 0.1 % ophthalmic solution Administer 1 drop into both eyes in the morning and 1 drop before bedtime.      omeprazole (PriLOSEC) 20 mg DR capsule Take 1 capsule (20 mg) by mouth once daily.      polyvinyl alcohol (Liquifilm Tears) 1.4 % ophthalmic solution Administer 1 drop into both  eyes if needed.      predniSONE (Deltasone) 10 mg tablet Take 1 tablet (10 mg) by mouth once daily.      predniSONE (Deltasone) 20 mg tablet Take 1 tablet (20 mg) by mouth once daily.      predniSONE (Deltasone) 20 mg tablet Take 2 tablets (40 mg) by mouth once daily for 5 days. 10 tablet 0    propranolol (Inderal) 10 mg tablet TAKE 1 TABLET (10 MG) BY MOUTH 2 TIMES A DAY. 60 tablet 2    Tab-A-Christel Multivitamin w-iron 15 mg iron- 400 mcg tablet TAKE 1 TABLET BY MOUTH ONCE DAILY. 30 tablet 2    VITAMIN A ORAL Take 1 tablet by mouth once daily.       No current facility-administered medications for this visit.       Assessment and Plan  Makenna was seen today for bronchitis.  Diagnoses and all orders for this visit:  Bronchitis (Primary)  -     Discontinue: doxycycline (Vibramycin) 100 mg capsule; Take 1 capsule (100 mg) by mouth 2 times a day for 7 days. Take with at least 8 ounces (large glass) of water, do not lie down for 30 minutes after  -     XR chest 2 views; Future  -     doxycycline (Vibramycin) 100 mg capsule; Take 1 capsule (100 mg) by mouth 2 times a day for 7 days. Take with at least 8 ounces (large glass) of water, do not lie down for 30 minutes after  -     benzonatate (Tessalon) 200 mg capsule; Take 1 capsule (200 mg) by mouth 3 times a day as needed for cough. Do not crush or chew.  -     predniSONE (Deltasone) 20 mg tablet; Take 2 tablets (40 mg) by mouth once daily for 5 days.     Acute bronchitis.  Worsening course over the past few days.  Sputum production is not improving and cough is not getting better.  Recommend starting antibiotics and cough medications.  Recommend Cxray  Start Prednisone in 2 days if not better    If symptoms get worse, go to ER.  Call back if not better in 72 hrs.    An interactive audio and video telecommunication system which permits real time communications between the patient (at the originating site) and provider (at the distant site) was utilized to provide this  telehealth service.    Verbal consent was requested and obtained from the patient on the day of encounter.  This is a virtual visit using HIPAA compliant video platform. It required patient-provider interaction for the medical decision making as documented.     I have communicated my name and active licensure. The patient's identity and physical location were verified at the time of this visit.   Either the patient or their legal representative has been informed of the risks and benefits of -- and alternatives to -- treatment through a remote evaluation and consents to proceed with the evaluation remotely.    Total time spent was 12  minutes for this encounter, including chart review, discussion with the patient and addressing their concerns.    Immunizations:  Immunization History   Administered Date(s) Administered    COVID-19, mRNA, LNP-S, PF, 30 mcg/0.3 mL dose 11/24/2021, 12/17/2021    Flu vaccine, trivalent, preservative free, HIGH-DOSE, age 65y+ (Fluzone) 11/28/2016, 11/07/2017, 08/28/2018, 09/01/2020    Influenza, Unspecified 11/08/2011    Influenza, seasonal, injectable 09/24/2013    Pneumococcal conjugate vaccine, 13-valent (PREVNAR 13) 04/05/2017    Pneumococcal polysaccharide vaccine, 23-valent, age 2 years and older (PNEUMOVAX 23) 01/15/2018    Pneumococcal, Unspecified 11/16/2016

## 2024-12-24 DIAGNOSIS — E55.9 VITAMIN D DEFICIENCY: ICD-10-CM

## 2024-12-24 DIAGNOSIS — I10 ESSENTIAL HYPERTENSION: ICD-10-CM

## 2024-12-24 RX ORDER — ACETAMINOPHEN 500 MG
TABLET ORAL
Qty: 30 CAPSULE | Refills: 0 | Status: SHIPPED | OUTPATIENT
Start: 2024-12-24

## 2024-12-24 RX ORDER — ASPIRIN 81 MG/1
81 TABLET ORAL DAILY
Qty: 90 TABLET | Refills: 0 | Status: SHIPPED | OUTPATIENT
Start: 2024-12-24 | End: 2025-03-24

## 2024-12-26 DIAGNOSIS — J40 BRONCHITIS: ICD-10-CM

## 2024-12-26 RX ORDER — MINERAL OIL
180 ENEMA (ML) RECTAL DAILY
Qty: 30 TABLET | Refills: 2 | Status: SHIPPED | OUTPATIENT
Start: 2024-12-26

## 2024-12-27 ENCOUNTER — APPOINTMENT (OUTPATIENT)
Dept: PHYSICAL THERAPY | Facility: CLINIC | Age: 84
End: 2024-12-27
Payer: COMMERCIAL

## 2024-12-27 ENCOUNTER — TELEPHONE (OUTPATIENT)
Dept: PRIMARY CARE | Facility: CLINIC | Age: 84
End: 2024-12-27
Payer: COMMERCIAL

## 2025-01-02 ENCOUNTER — HOSPITAL ENCOUNTER (OUTPATIENT)
Dept: RADIOLOGY | Facility: CLINIC | Age: 85
Discharge: HOME | End: 2025-01-02
Payer: COMMERCIAL

## 2025-01-02 DIAGNOSIS — J40 BRONCHITIS: ICD-10-CM

## 2025-01-02 PROCEDURE — 71046 X-RAY EXAM CHEST 2 VIEWS: CPT

## 2025-01-03 ENCOUNTER — APPOINTMENT (OUTPATIENT)
Dept: PRIMARY CARE | Facility: CLINIC | Age: 85
End: 2025-01-03
Payer: COMMERCIAL

## 2025-01-03 DIAGNOSIS — R53.83 OTHER FATIGUE: ICD-10-CM

## 2025-01-03 DIAGNOSIS — J40 BRONCHITIS: Primary | ICD-10-CM

## 2025-01-03 ASSESSMENT — ENCOUNTER SYMPTOMS
SHORTNESS OF BREATH: 0
APPETITE CHANGE: 1
ACTIVITY CHANGE: 1
PALPITATIONS: 0
FEVER: 0
COUGH: 1
FATIGUE: 1

## 2025-01-03 NOTE — PROGRESS NOTES
Chief Complaint:   Chief Complaint   Patient presents with    Follow-up    EDDA Black is a 84 y.o. year old female who presents to the clinic for a virtual visit.  URI   Associated symptoms include coughing. Pertinent negatives include no chest pain.        Past Medical History   Past Medical History:   Diagnosis Date    Body mass index (BMI) 34.0-34.9, adult 08/02/2019    Body mass index (BMI) of 34.0 to 34.9 in adult    Body mass index (BMI) 36.0-36.9, adult 10/05/2021    BMI 36.0-36.9,adult    Body mass index (BMI) 37.0-37.9, adult 07/06/2021    Body mass index (BMI) of 37.0 to 37.9 in adult    Body mass index (BMI) 37.0-37.9, adult 01/04/2022    Body mass index (BMI) of 37.0 to 37.9 in adult    Encounter for screening mammogram for malignant neoplasm of breast     Visit for screening mammogram    Morbid (severe) obesity due to excess calories (Multi) 01/04/2022    Class 2 severe obesity with serious comorbidity and body mass index (BMI) of 37.0 to 37.9 in adult, unspecified obesity type    Morbid (severe) obesity due to excess calories (Multi) 10/05/2021    Class 2 severe obesity with serious comorbidity and body mass index (BMI) of 36.0 to 36.9 in adult, unspecified obesity type    Personal history of other diseases of the digestive system     History of cholelithiasis      Past Surgical History:   No past surgical history on file.  Family History:   No family history on file.  Social History:   Tobacco Use: Low Risk  (8/21/2024)    Patient History     Smoking Tobacco Use: Never     Smokeless Tobacco Use: Never     Passive Exposure: Not on file      Social History     Substance and Sexual Activity   Alcohol Use Not Currently        Allergies:   Allergies   Allergen Reactions    Lisinopril Cough        ROS   Review of Systems   Constitutional:  Positive for activity change, appetite change and fatigue. Negative for fever.   Respiratory:  Positive for cough. Negative for shortness of breath.   "  Cardiovascular:  Negative for chest pain, palpitations and leg swelling.        Objective   There were no vitals filed for this visit.   BMI Readings from Last 15 Encounters:   12/17/24 34.57 kg/m²   09/15/24 33.84 kg/m²   09/10/24 36.36 kg/m²   08/21/24 36.52 kg/m²   03/19/24 36.52 kg/m²   01/31/24 36.91 kg/m²   12/01/23 36.91 kg/m²   08/25/23 36.33 kg/m²   08/14/23 36.33 kg/m²   01/17/23 37.69 kg/m²   10/11/22 36.13 kg/m²   08/30/22 35.94 kg/m²   07/12/22 35.94 kg/m²   05/19/22 37.89 kg/m²      85.7 kg (189 lb) (12/17/2024  9:48 AM)      Physical Exam  Physical Exam  Neurological:      Mental Status: She is alert.   Psychiatric:         Mood and Affect: Mood normal.          Labs:  CBC:  Recent Labs     09/03/24  1058 01/31/24  1412 08/14/23  1047   WBC 6.1 7.9 6.7   HGB 14.5 13.9 14.1   HCT 44.5 43.5 44.7    327 243   MCV 94 92 94     CMP:  Recent Labs     09/03/24  1058 01/31/24  1412 08/14/23  1047    142 143   K 4.7 4.3 4.4    104 104   CO2 29 28 29   ANIONGAP 14 14 14   BUN 16 10 10   CREATININE 0.68 0.58 0.68   EGFR 87 90  --    GLUCOSE 101* 88 96     Recent Labs     09/03/24  1058 01/31/24  1412 08/14/23  1047   ALBUMIN 4.5 4.2 4.4   ALKPHOS 42 47 43   ALT 27 26 21   AST 20 19 21   BILITOT 0.4 0.3 0.4     Calcium/Phos:   Lab Results   Component Value Date    CALCIUM 9.5 09/03/2024      COAG: No results for input(s): \"INR\", \"DDIMERVTE\" in the last 43550 hours.  CRP:   Lab Results   Component Value Date    CRP 1.19 (A) 01/20/2021      [unfilled]   ENDO:  Recent Labs     09/03/24  1058 01/31/24  1412 08/14/23  1047 01/17/23  1207   TSH 0.90 0.59 0.50 0.63   HGBA1C 5.6 5.7* 5.4 5.6      CARDIAC: No results for input(s): \"LDH\", \"CKMB\", \"TROPHS\", \"BNP\" in the last 91196 hours.    No lab exists for component: \"CK\", \"CKMBP\"  Recent Labs     09/03/24  1058 01/31/24  1412 08/14/23  1047 10/11/22  1054 04/08/22  1128   CHOL 246* 316* 247* 234* 301*   LDLF  --   --  139* 130* 195*   LDLCALC " 151* 211  --   --   --    HDL 75.7 71.4 77.3 80.4 76.0   TRIG 98 166* 152* 119 148     No data recorded    Current Medications:  Current Outpatient Medications   Medication Sig Dispense Refill    aspirin 81 mg EC tablet TAKE 1 TABLET (81 MG) BY MOUTH ONCE DAILY. WITH FOOD 90 tablet 0    atorvastatin (Lipitor) 40 mg tablet TAKE 1 TABLET (40 MG) BY MOUTH ONCE DAILY. 90 tablet 0    benzonatate (Tessalon) 200 mg capsule Take 1 capsule (200 mg) by mouth 3 times a day as needed for cough. Do not crush or chew. 42 capsule 0    blood pressure monitor kit 1 each once daily. 1 kit 0    calcium carbonate-vitamin D3 600 mg-10 mcg (400 unit) tablet Take 2 tablets by mouth once daily. 60 tablet 2    calcium carbonate-vitamin D3 600 mg-5 mcg (200 unit) tablet Take 1 tablet by mouth 2 times a day. 60 tablet 1    cholecalciferol (Vitamin D-3) 50 mcg (2,000 unit) capsule TAKE ONE CAPSULE DAILY 30 capsule 0    ciprofloxacin (Cetraxal) 0.2 % otic solution Administer 0.25 mL into affected ear(s) in the morning and 0.25 mL at noon and 0.25 mL in the evening and 0.25 mL before bedtime.      diclofenac sodium 1 % kit Place 1 each on the skin.      diclofenac sodium 3 % gel Apply 1 Application topically 2 times a day as needed (Pain). 100 g 2    docusate sodium (Colace) 100 mg capsule Take 1 capsule (100 mg) by mouth in the morning and 1 capsule (100 mg) before bedtime.      DULoxetine (Cymbalta) 30 mg DR capsule Take 1 capsule (30 mg) by mouth once daily. 90 capsule 0    FeroSuL tablet TAKE 1 TABLET BY MOUTH ONCE DAILY. 30 tablet 2    fexofenadine (Allegra) 180 mg tablet TAKE 1 TABLET (180 MG) BY MOUTH ONCE DAILY. 30 tablet 2    fluticasone (Flonase) 50 mcg/actuation nasal spray Administer 1 spray into each nostril once daily. 16 g 1    fluticasone propion-salmeteroL (Advair Diskus) 250-50 mcg/dose diskus inhaler Inhale 1 puff.      folic acid (Folvite) 1 mg tablet Take 1 tablet (1 mg) by mouth once daily. 30 tablet 2    glucosamine armas  2KCl-chondroit 750-600 mg tablet Take 1 each by mouth.      ibuprofen 600 mg tablet TAKE 1 TABLET (600 MG) BY MOUTH 3 TIMES A DAY AS NEEDED FOR MILD PAIN (1 - 3) (PAIN). 60 tablet 0    ipratropium (Atrovent HFA) 17 mcg/actuation inhaler Inhale 1 puff  in the morning and 1 puff at noon and 1 puff in the evening and 1 puff before bedtime.      ketorolac (Acular) 0.5 % ophthalmic solution Administer into both eyes 4 times a day.      levomilnacipranR (Fetzima) 40 mg extended release capsule Take 1 capsule (40 mg) by mouth once daily. 30 capsule 2    levothyroxine (Synthroid, Levoxyl) 100 mcg tablet TAKE 1 TABLET (100 MCG) BY MOUTH ONCE DAILY. 90 tablet 0    loratadine (Claritin) 10 mg tablet Take 1 tablet (10 mg) by mouth once daily. 90 tablet 0    meclizine (Antivert) 25 mg tablet TAKE 1 TABLET 3 TIMES DAILY AS NEEDED FOR DIZZINESS 90 tablet 0    methotrexate (Trexall) 2.5 mg tablet Take 1 tablet (2.5 mg total) by mouth 1 (one) time per week.      nebivolol (Bystolic) 10 mg tablet Take 1 tablet (10 mg) by mouth once daily. 90 tablet 0    olopatadine (Patanol) 0.1 % ophthalmic solution Administer 1 drop into both eyes in the morning and 1 drop before bedtime.      omeprazole (PriLOSEC) 20 mg DR capsule Take 1 capsule (20 mg) by mouth once daily.      polyvinyl alcohol (Liquifilm Tears) 1.4 % ophthalmic solution Administer 1 drop into both eyes if needed.      predniSONE (Deltasone) 10 mg tablet Take 1 tablet (10 mg) by mouth once daily.      predniSONE (Deltasone) 20 mg tablet Take 1 tablet (20 mg) by mouth once daily.      propranolol (Inderal) 10 mg tablet TAKE 1 TABLET (10 MG) BY MOUTH 2 TIMES A DAY. 60 tablet 2    Tab-A-Christel Multivitamin w-iron 15 mg iron- 400 mcg tablet TAKE 1 TABLET BY MOUTH ONCE DAILY. 30 tablet 2    VITAMIN A ORAL Take 1 tablet by mouth once daily.       No current facility-administered medications for this visit.       Assessment and Plan  Makenna was seen today for follow-up and uri.  Diagnoses  and all orders for this visit:  Bronchitis (Primary)  -     CBC; Future  -     Comprehensive metabolic panel; Future  Other fatigue  -     CBC; Future  -     Comprehensive metabolic panel; Future   S/p recent viral URI, likely flu, considering the severity of symptoms.  Cxray negative.  Completed a course of antibiotics for superinfection, Prednisone for airway reactive episode and taking Tessalon/Mucinex.  Feeling very tired.  No energy, no appetite.  We will check CBC and CMP to make sure that her electrolytes and counts are OK.  Otherwise, supportive care with vitamin C, vitamin D and hydration electrolyte solutions.      An interactive audio and video telecommunication system which permits real time communications between the patient (at the originating site) and provider (at the distant site) was utilized to provide this telehealth service.    Verbal consent was requested and obtained from the patient on the day of encounter.  This is a virtual visit using HIPAA compliant video platform. It required patient-provider interaction for the medical decision making as documented.     I have communicated my name and active licensure. The patient's identity and physical location were verified at the time of this visit.   Either the patient or their legal representative has been informed of the risks and benefits of -- and alternatives to -- treatment through a remote evaluation and consents to proceed with the evaluation remotely.    Total time spent was 12  minutes for this encounter, including chart review, discussion with the patient and addressing their concerns.      Immunizations:  Immunization History   Administered Date(s) Administered    COVID-19, mRNA, LNP-S, PF, 30 mcg/0.3 mL dose 11/24/2021, 12/17/2021    Flu vaccine, trivalent, preservative free, HIGH-DOSE, age 65y+ (Fluzone) 11/28/2016, 11/07/2017, 08/28/2018, 09/01/2020    Influenza, Unspecified 11/08/2011    Influenza, seasonal, injectable 09/24/2013     Pneumococcal conjugate vaccine, 13-valent (PREVNAR 13) 04/05/2017    Pneumococcal polysaccharide vaccine, 23-valent, age 2 years and older (PNEUMOVAX 23) 01/15/2018    Pneumococcal, Unspecified 11/16/2016

## 2025-01-10 ENCOUNTER — LAB (OUTPATIENT)
Dept: LAB | Facility: LAB | Age: 85
End: 2025-01-10
Payer: COMMERCIAL

## 2025-01-10 DIAGNOSIS — R53.83 OTHER FATIGUE: ICD-10-CM

## 2025-01-10 DIAGNOSIS — J40 BRONCHITIS: ICD-10-CM

## 2025-01-10 PROCEDURE — 85027 COMPLETE CBC AUTOMATED: CPT

## 2025-01-10 PROCEDURE — 80053 COMPREHEN METABOLIC PANEL: CPT

## 2025-01-11 LAB
ALBUMIN SERPL BCP-MCNC: 3.9 G/DL (ref 3.4–5)
ALP SERPL-CCNC: 30 U/L (ref 33–136)
ALT SERPL W P-5'-P-CCNC: 20 U/L (ref 7–45)
ANION GAP SERPL CALC-SCNC: 13 MMOL/L (ref 10–20)
AST SERPL W P-5'-P-CCNC: 16 U/L (ref 9–39)
BILIRUB SERPL-MCNC: 0.4 MG/DL (ref 0–1.2)
BUN SERPL-MCNC: 15 MG/DL (ref 6–23)
CALCIUM SERPL-MCNC: 9.3 MG/DL (ref 8.6–10.6)
CHLORIDE SERPL-SCNC: 107 MMOL/L (ref 98–107)
CO2 SERPL-SCNC: 26 MMOL/L (ref 21–32)
CREAT SERPL-MCNC: 0.62 MG/DL (ref 0.5–1.05)
EGFRCR SERPLBLD CKD-EPI 2021: 88 ML/MIN/1.73M*2
ERYTHROCYTE [DISTWIDTH] IN BLOOD BY AUTOMATED COUNT: 13.8 % (ref 11.5–14.5)
GLUCOSE SERPL-MCNC: 100 MG/DL (ref 74–99)
HCT VFR BLD AUTO: 41.8 % (ref 36–46)
HGB BLD-MCNC: 13.4 G/DL (ref 12–16)
MCH RBC QN AUTO: 29.8 PG (ref 26–34)
MCHC RBC AUTO-ENTMCNC: 32.1 G/DL (ref 32–36)
MCV RBC AUTO: 93 FL (ref 80–100)
NRBC BLD-RTO: 0 /100 WBCS (ref 0–0)
PLATELET # BLD AUTO: 306 X10*3/UL (ref 150–450)
POTASSIUM SERPL-SCNC: 4.5 MMOL/L (ref 3.5–5.3)
PROT SERPL-MCNC: 6.4 G/DL (ref 6.4–8.2)
RBC # BLD AUTO: 4.49 X10*6/UL (ref 4–5.2)
SODIUM SERPL-SCNC: 141 MMOL/L (ref 136–145)
WBC # BLD AUTO: 9.1 X10*3/UL (ref 4.4–11.3)

## 2025-01-13 ENCOUNTER — APPOINTMENT (OUTPATIENT)
Dept: INTEGRATIVE MEDICINE | Facility: CLINIC | Age: 85
End: 2025-01-13
Payer: COMMERCIAL

## 2025-01-13 DIAGNOSIS — M06.9 RHEUMATOID ARTHRITIS, INVOLVING UNSPECIFIED SITE, UNSPECIFIED WHETHER RHEUMATOID FACTOR PRESENT (MULTI): ICD-10-CM

## 2025-01-13 NOTE — PROGRESS NOTES
Acupuncture Visit:     Subjective   Patient ID: Makenna Black is a 84 y.o. female who presents for No chief complaint on file.  2025 Acupuncture Benefits   01/08/2025 RA   1. Shot Stats Dual Complete  1/12 + 8 ADD: VPCY    *Pt has [place]  present via phone. States she was unable to attend the visit today in person because she was sick. She states her name as Lucrecia from the [facility].    States LBP and BL knee pain (worse R knee)  Pain radiation down legs and hips  Denies surgery to her back.  Notes legs and feet have numbness  States she has HTN, Asthma, RA, no diabetes        Session Information  Is this acupuncture treatment being billed to the patient's insurance company: Yes  This is visit number: 1  The patient has a total number of visits of: 12  Initial Acupuncture Treatment date: 01/13/25  Last Treatment date: 04/14/25  Name of Insurance Company: [employer] Dual Complete  Visit Type: New patient         Review of Systems         Provider reviewed plan for the acupuncture session, precautions and contraindications. Patient/guardian/hospital staff has given consent to treat with full understanding of what to expect during the session. Before acupuncture began, provider explained to the patient to communicate at any time if the procedure was causing discomfort past their tolerance level. Patient agreed to advise acupuncturist. The acupuncturist counseled the patient on the risks of acupuncture treatment including pain, infection, bleeding, and no relief of pain. The patient was positioned comfortably. There was no evidence of infection at the site of needle insertions.    Objective   Physical Exam              Acupuncture Treatment  Body Points - Left: Marissa 9  Body Points - Bilateral: DU 20, Kd 7, 10, SP 6, LI 4, Lv 3, SI 3, UB 62,  Body Points - Right: ST 36  Other Techniques Utilized: TDP Lamp  TDP Lamp Descripton: feet  Needle Count In: 17  Needle Count Out:  17  Total Face to Face Time (min): 25 minutes              Assessment/Plan

## 2025-01-17 ENCOUNTER — HOME VISIT (OUTPATIENT)
Dept: PRIMARY CARE | Facility: CLINIC | Age: 85
End: 2025-01-17
Payer: COMMERCIAL

## 2025-01-17 DIAGNOSIS — J44.9 CHRONIC OBSTRUCTIVE PULMONARY DISEASE, UNSPECIFIED COPD TYPE (MULTI): Primary | ICD-10-CM

## 2025-01-17 DIAGNOSIS — R42 DIZZINESS: ICD-10-CM

## 2025-01-17 PROCEDURE — 99375 HOME HEALTH CARE SUPERVISION: CPT | Performed by: INTERNAL MEDICINE

## 2025-01-20 ENCOUNTER — APPOINTMENT (OUTPATIENT)
Dept: INTEGRATIVE MEDICINE | Facility: CLINIC | Age: 85
End: 2025-01-20
Payer: COMMERCIAL

## 2025-01-20 DIAGNOSIS — M06.9 RHEUMATOID ARTHRITIS, INVOLVING UNSPECIFIED SITE, UNSPECIFIED WHETHER RHEUMATOID FACTOR PRESENT (MULTI): Primary | ICD-10-CM

## 2025-01-20 NOTE — PROGRESS NOTES
Acupuncture Visit:     Subjective   Patient ID: Makenna Black is a 84 y.o. female who presents for No chief complaint on file.  2025 Acupuncture Benefits   01/08/2025 RA   1. [email or web address] Complete  2/12 + 8 ADD: VPCY    *Pt has  Francesca present    States overall she feels better  No change in sx  Left radiation down outer leg  to foot  R radiation stops at the knee    Initial  States LBP and BL knee pain (worse R knee)  Pain radiation down legs and hips  Denies surgery to her back.  Notes legs and feet have numbness  States she has HTN, Asthma, RA, no diabetes        Session Information  Is this acupuncture treatment being billed to the patient's insurance company: Yes  This is visit number: 2  The patient has a total number of visits of: 12  Initial Acupuncture Treatment date: 01/13/25  Last Treatment date: 04/14/25  Name of Insurance Company: [employer] Complete         Review of Systems         Provider reviewed plan for the acupuncture session, precautions and contraindications. Patient/guardian/hospital staff has given consent to treat with full understanding of what to expect during the session. Before acupuncture began, provider explained to the patient to communicate at any time if the procedure was causing discomfort past their tolerance level. Patient agreed to advise acupuncturist. The acupuncturist counseled the patient on the risks of acupuncture treatment including pain, infection, bleeding, and no relief of pain. The patient was positioned comfortably. There was no evidence of infection at the site of needle insertions.    Objective   Physical Exam              Acupuncture Treatment  Body Points - Left: Marissa 9  Body Points - Bilateral: DU 20, Kd 6, SP 6, 9, LI 4, Lv 3, SI 3, UB 62, ST 44, (Kd 7, 10 NEXT)  Body Points - Right: LGDB  Auricular Points: Yes  Auricular Points - Bilateral: BFA 1-3  Other Techniques Utilized: TDP Lamp  TDP Lamp Descripton: feet  Needle Count In:  26  Needle Count Out: 26  Total Face to Face Time (min): 25 minutes              Assessment/Plan

## 2025-01-21 DIAGNOSIS — E55.9 VITAMIN D DEFICIENCY: ICD-10-CM

## 2025-01-21 RX ORDER — NICOTINE 11MG/24HR
PATCH, TRANSDERMAL 24 HOURS TRANSDERMAL DAILY
Qty: 30 CAPSULE | Refills: 0 | Status: SHIPPED | OUTPATIENT
Start: 2025-01-21

## 2025-01-27 ENCOUNTER — APPOINTMENT (OUTPATIENT)
Dept: INTEGRATIVE MEDICINE | Facility: CLINIC | Age: 85
End: 2025-01-27
Payer: COMMERCIAL

## 2025-01-27 DIAGNOSIS — M54.59 OTHER LOW BACK PAIN: Primary | ICD-10-CM

## 2025-01-27 DIAGNOSIS — M06.9 RHEUMATOID ARTHRITIS, INVOLVING UNSPECIFIED SITE, UNSPECIFIED WHETHER RHEUMATOID FACTOR PRESENT (MULTI): ICD-10-CM

## 2025-01-27 PROCEDURE — 97810 ACUP 1/> WO ESTIM 1ST 15 MIN: CPT | Performed by: ACUPUNCTURIST

## 2025-01-27 PROCEDURE — 97811 ACUP 1/> W/O ESTIM EA ADD 15: CPT | Performed by: ACUPUNCTURIST

## 2025-01-27 NOTE — PROGRESS NOTES
Acupuncture Visit:     Subjective   Patient ID: Makenna Black is a 84 y.o. female who presents for No chief complaint on file.  2025 Acupuncture Benefits   01/08/2025 RA   1. [employer] Complete  3/12 + 8 ADD: VPCY    *Pt has  Francesca present    States pain level decreased from 4-5/10 to 3/10      prev  States overall she feels better  No change in sx  Left radiation down outer leg  to foot  R radiation stops at the knee    Initial  States LBP and BL knee pain (worse R knee)  Pain radiation down legs and hips  Denies surgery to her back.  Notes legs and feet have numbness  States she has HTN, Asthma, RA, no diabetes        Session Information  This is visit number: 3  The patient has a total number of visits of: 12  Initial Acupuncture Treatment date: 01/13/25  Last Treatment date: 04/14/25  Name of Insurance Company: [employer] Complete  Visit Type: Follow-up visit         Review of Systems         Provider reviewed plan for the acupuncture session, precautions and contraindications. Patient/guardian/hospital staff has given consent to treat with full understanding of what to expect during the session. Before acupuncture began, provider explained to the patient to communicate at any time if the procedure was causing discomfort past their tolerance level. Patient agreed to advise acupuncturist. The acupuncturist counseled the patient on the risks of acupuncture treatment including pain, infection, bleeding, and no relief of pain. The patient was positioned comfortably. There was no evidence of infection at the site of needle insertions.    Objective   Physical Exam              Acupuncture Treatment  Body Points - Bilateral: DU 20, Kd 6, SP 6, 9, 10, LI 4, Lv 3, UB 62, ST 44  Auricular Points - Bilateral: BFA 1-3  TDP Lamp Descripton: feet  Needle Count In: 23  Needle Count Out: 23  Total Face to Face Time (min): 25 minutes              Assessment/Plan

## 2025-02-03 ENCOUNTER — APPOINTMENT (OUTPATIENT)
Dept: INTEGRATIVE MEDICINE | Facility: CLINIC | Age: 85
End: 2025-02-03
Payer: COMMERCIAL

## 2025-02-03 DIAGNOSIS — M54.59 OTHER LOW BACK PAIN: Primary | ICD-10-CM

## 2025-02-03 PROCEDURE — 97811 ACUP 1/> W/O ESTIM EA ADD 15: CPT | Performed by: ACUPUNCTURIST

## 2025-02-03 PROCEDURE — 97810 ACUP 1/> WO ESTIM 1ST 15 MIN: CPT | Performed by: ACUPUNCTURIST

## 2025-02-03 NOTE — PROGRESS NOTES
Acupuncture Visit:     Subjective   Patient ID: Makenna Black is a 84 y.o. female who presents for No chief complaint on file.  2025 Acupuncture Benefits   01/08/2025 RA   1. [employer] Complete  4/12 + 8 ADD: VPCY    *Pt has  Francesca present    States she is improving, less intense pain  3-4/10  Comes and goes  L side radiation worse than R    prev  States pain level decreased from 4-5/10 to 3/10    prev  States overall she feels better  No change in sx  Left radiation down outer leg  to foot  R radiation stops at the knee    Initial  States LBP and BL knee pain (worse R knee)  Pain radiation down legs and hips  Denies surgery to her back.  Notes legs and feet have numbness  States she has HTN, Asthma, RA, no diabetes        Session Information  Is this acupuncture treatment being billed to the patient's insurance company: Yes  This is visit number: 4  The patient has a total number of visits of: 12  Initial Acupuncture Treatment date: 01/13/25  Last Treatment date: 04/14/25  Name of Insurance Company: [employer] Complete  Visit Type: Follow-up visit         Review of Systems         Provider reviewed plan for the acupuncture session, precautions and contraindications. Patient/guardian/hospital staff has given consent to treat with full understanding of what to expect during the session. Before acupuncture began, provider explained to the patient to communicate at any time if the procedure was causing discomfort past their tolerance level. Patient agreed to advise acupuncturist. The acupuncturist counseled the patient on the risks of acupuncture treatment including pain, infection, bleeding, and no relief of pain. The patient was positioned comfortably. There was no evidence of infection at the site of needle insertions.    Objective   Physical Exam              Acupuncture Treatment  Body Points - Left: Marissa 9  Body Points - Bilateral: DU 20, Kd 6, SP 6, 77.18, LI 4, Lv 3, UB 62, ST  44  Body Points - Right: LGDB  Auricular Points - Bilateral: BFA 1-3  TDP Lamp Descripton: feet  Needle Count In: 24  Needle Count Out: 24  Total Face to Face Time (min): 25 minutes              Assessment/Plan

## 2025-02-04 DIAGNOSIS — D64.9 ANEMIA, UNSPECIFIED TYPE: ICD-10-CM

## 2025-02-04 RX ORDER — MULTIVIT/IRON SULF/FOLIC ACID 15MG-0.4MG
1 TABLET ORAL DAILY
Qty: 30 TABLET | Refills: 2 | Status: SHIPPED | OUTPATIENT
Start: 2025-02-04

## 2025-02-10 ENCOUNTER — APPOINTMENT (OUTPATIENT)
Dept: INTEGRATIVE MEDICINE | Facility: CLINIC | Age: 85
End: 2025-02-10
Payer: COMMERCIAL

## 2025-02-10 DIAGNOSIS — M54.59 OTHER LOW BACK PAIN: Primary | ICD-10-CM

## 2025-02-10 PROCEDURE — 97810 ACUP 1/> WO ESTIM 1ST 15 MIN: CPT | Performed by: ACUPUNCTURIST

## 2025-02-10 PROCEDURE — 97811 ACUP 1/> W/O ESTIM EA ADD 15: CPT | Performed by: ACUPUNCTURIST

## 2025-02-10 NOTE — PROGRESS NOTES
Acupuncture Visit:     Subjective   Patient ID: Makenna Black is a 84 y.o. female who presents for No chief complaint on file.  2025 Acupuncture Benefits   01/08/2025 RA   1. [employer] Complete  5/12 + 8 ADD: VPCY    *Pt has  Francesca present    States her balance is improved  LBP has not changes this week  R knee pain, swelling    prev  States she is improving, less intense pain  3-4/10  Comes and goes  L side radiation worse than R    prev  States pain level decreased from 4-5/10 to 3/10    prev  States overall she feels better  No change in sx  Left radiation down outer leg  to foot  R radiation stops at the knee    Initial  States LBP and BL knee pain (worse R knee)  Pain radiation down legs and hips  Denies surgery to her back.  Notes legs and feet have numbness  States she has HTN, Asthma, RA, no diabetes        Session Information  This is visit number: 5  The patient has a total number of visits of: 12  Initial Acupuncture Treatment date: 01/13/25  Last Treatment date: 04/14/25  Name of Insurance Company: [employer] Complete  Visit Type: Follow-up visit         Review of Systems         Provider reviewed plan for the acupuncture session, precautions and contraindications. Patient/guardian/hospital staff has given consent to treat with full understanding of what to expect during the session. Before acupuncture began, provider explained to the patient to communicate at any time if the procedure was causing discomfort past their tolerance level. Patient agreed to advise acupuncturist. The acupuncturist counseled the patient on the risks of acupuncture treatment including pain, infection, bleeding, and no relief of pain. The patient was positioned comfortably. There was no evidence of infection at the site of needle insertions.    Objective   Physical Exam              Acupuncture Treatment  Body Points - Left: 77.17-21  Body Points - Bilateral: Du 20  Body Points - Right: 88.17-9,  elizabeth peña  Auricular Points - Bilateral: BFA 1-3  TDP Lamp Descripton: feet  Needle Count In: 17  Needle Count Out: 17  Total Face to Face Time (min): 25 minutes              Assessment/Plan

## 2025-02-17 ENCOUNTER — APPOINTMENT (OUTPATIENT)
Dept: INTEGRATIVE MEDICINE | Facility: CLINIC | Age: 85
End: 2025-02-17
Payer: COMMERCIAL

## 2025-02-17 DIAGNOSIS — E55.9 VITAMIN D DEFICIENCY: ICD-10-CM

## 2025-02-17 DIAGNOSIS — M54.59 OTHER LOW BACK PAIN: Primary | ICD-10-CM

## 2025-02-17 DIAGNOSIS — J84.10 PULMONARY FIBROSIS (MULTI): ICD-10-CM

## 2025-02-17 DIAGNOSIS — G25.0 ESSENTIAL TREMOR: ICD-10-CM

## 2025-02-17 PROCEDURE — 97810 ACUP 1/> WO ESTIM 1ST 15 MIN: CPT | Performed by: ACUPUNCTURIST

## 2025-02-17 PROCEDURE — 97811 ACUP 1/> W/O ESTIM EA ADD 15: CPT | Performed by: ACUPUNCTURIST

## 2025-02-17 RX ORDER — PROPRANOLOL HYDROCHLORIDE 10 MG/1
10 TABLET ORAL 2 TIMES DAILY
Qty: 60 TABLET | Refills: 2 | Status: SHIPPED | OUTPATIENT
Start: 2025-02-17 | End: 2025-05-18

## 2025-02-17 RX ORDER — NICOTINE 11MG/24HR
PATCH, TRANSDERMAL 24 HOURS TRANSDERMAL DAILY
Qty: 30 CAPSULE | Refills: 0 | Status: SHIPPED | OUTPATIENT
Start: 2025-02-17

## 2025-02-17 RX ORDER — FLUTICASONE PROPIONATE 50 MCG
1 SPRAY, SUSPENSION (ML) NASAL DAILY
Qty: 16 G | Refills: 1 | Status: SHIPPED | OUTPATIENT
Start: 2025-02-17 | End: 2025-04-18

## 2025-02-17 NOTE — PROGRESS NOTES
Acupuncture Visit:     Subjective   Patient ID: Makenna Black is a 84 y.o. female who presents for No chief complaint on file.  2025 Acupuncture Benefits   01/08/2025 RA   1. [employer] Complete  6/12 + 8 ADD: VPCY    *Pt has  Francesca present    States pain level down from 4/10 t0 1/10  She is happy about her progress  Notes RIGHT knee pain worse at night and weather    prev  States her balance is improved  LBP has not changes this week  R knee pain, swelling    prev  States she is improving, less intense pain  3-4/10  Comes and goes  L side radiation worse than R    prev  States pain level decreased from 4-5/10 to 3/10    prev  States overall she feels better  No change in sx  Left radiation down outer leg  to foot  R radiation stops at the knee    Initial  States LBP and BL knee pain (worse R knee)  Pain radiation down legs and hips  Denies surgery to her back.  Notes legs and feet have numbness  States she has HTN, Asthma, RA, no diabetes        Session Information  Is this acupuncture treatment being billed to the patient's insurance company: Yes  This is visit number: 6  The patient has a total number of visits of: 12  Initial Acupuncture Treatment date: 01/13/25  Last Treatment date: 04/14/25  Name of Insurance Company: [employer] Complete         Review of Systems         Provider reviewed plan for the acupuncture session, precautions and contraindications. Patient/guardian/hospital staff has given consent to treat with full understanding of what to expect during the session. Before acupuncture began, provider explained to the patient to communicate at any time if the procedure was causing discomfort past their tolerance level. Patient agreed to advise acupuncturist. The acupuncturist counseled the patient on the risks of acupuncture treatment including pain, infection, bleeding, and no relief of pain. The patient was positioned comfortably. There was no evidence of infection at  the site of needle insertions.    Objective   Physical Exam              Acupuncture Treatment  Body Points - Left: 77.17-21  Body Points - Bilateral: Du 20  Kd 3  Body Points - Right: 88.17-9, mariana, elizabeth, SP 9  Auricular Points - Bilateral: BFA 1-3  TDP Lamp Descripton: feet  Needle Count In: 20  Needle Count Out: 20  Total Face to Face Time (min): 25 minutes              Assessment/Plan

## 2025-02-25 ENCOUNTER — APPOINTMENT (OUTPATIENT)
Dept: INTEGRATIVE MEDICINE | Facility: CLINIC | Age: 85
End: 2025-02-25
Payer: COMMERCIAL

## 2025-02-25 DIAGNOSIS — M54.59 OTHER LOW BACK PAIN: Primary | ICD-10-CM

## 2025-02-25 PROCEDURE — 97810 ACUP 1/> WO ESTIM 1ST 15 MIN: CPT | Performed by: ACUPUNCTURIST

## 2025-02-25 PROCEDURE — 97811 ACUP 1/> W/O ESTIM EA ADD 15: CPT | Performed by: ACUPUNCTURIST

## 2025-02-25 NOTE — PROGRESS NOTES
Acupuncture Visit:     Subjective   Patient ID: Makenna Black is a 84 y.o. female who presents for No chief complaint on file.  2025 Acupuncture Benefits   01/08/2025 RA   1. [employer] Complete  7/12 + 8 ADD: VPCY    *Pt has  Francesca present    Spasms in hips to tops of lower legs started 4 days ago  Squeezing pain to the bone    prev  States pain level down from 4/10 t0 1/10  She is happy about her progress  Notes RIGHT knee pain worse at night and weather    prev  States her balance is improved  LBP has not changes this week  R knee pain, swelling    prev  States she is improving, less intense pain  3-4/10  Comes and goes  L side radiation worse than R    prev  States pain level decreased from 4-5/10 to 3/10    prev  States overall she feels better  No change in sx  Left radiation down outer leg  to foot  R radiation stops at the knee    Initial  States LBP and BL knee pain (worse R knee)  Pain radiation down legs and hips  Denies surgery to her back.  Notes legs and feet have numbness  States she has HTN, Asthma, RA, no diabetes        Session Information  Is this acupuncture treatment being billed to the patient's insurance company: Yes  This is visit number: 7  The patient has a total number of visits of: 12  Initial Acupuncture Treatment date: 01/13/25  Last Treatment date: 04/14/25  Name of Insurance Company: [employer] Complete         Review of Systems         Provider reviewed plan for the acupuncture session, precautions and contraindications. Patient/guardian/hospital staff has given consent to treat with full understanding of what to expect during the session. Before acupuncture began, provider explained to the patient to communicate at any time if the procedure was causing discomfort past their tolerance level. Patient agreed to advise acupuncturist. The acupuncturist counseled the patient on the risks of acupuncture treatment including pain, infection, bleeding, and no  relief of pain. The patient was positioned comfortably. There was no evidence of infection at the site of needle insertions.    Objective   Physical Exam              Acupuncture Treatment  Body Points - Bilateral: Kd 6, Lv 3, LI 4, ST 44, UB 62, GB 39, 40, SJ 5, SP 10  Auricular Points - Bilateral: BFA 1-3  TDP Lamp Descripton: feet  Needle Count In: 24  Needle Count Out: 24  Total Face to Face Time (min): 25 minutes              Assessment/Plan

## 2025-02-28 ENCOUNTER — APPOINTMENT (OUTPATIENT)
Dept: INTEGRATIVE MEDICINE | Facility: CLINIC | Age: 85
End: 2025-02-28
Payer: COMMERCIAL

## 2025-03-03 ENCOUNTER — APPOINTMENT (OUTPATIENT)
Dept: INTEGRATIVE MEDICINE | Facility: CLINIC | Age: 85
End: 2025-03-03
Payer: COMMERCIAL

## 2025-03-05 ENCOUNTER — ALLIED HEALTH (OUTPATIENT)
Dept: INTEGRATIVE MEDICINE | Facility: CLINIC | Age: 85
End: 2025-03-05
Payer: COMMERCIAL

## 2025-03-05 DIAGNOSIS — E78.5 HYPERLIPIDEMIA, UNSPECIFIED HYPERLIPIDEMIA TYPE: ICD-10-CM

## 2025-03-05 DIAGNOSIS — M17.11 OSTEOARTHRITIS OF RIGHT KNEE, UNSPECIFIED OSTEOARTHRITIS TYPE: ICD-10-CM

## 2025-03-05 DIAGNOSIS — M54.59 OTHER LOW BACK PAIN: Primary | ICD-10-CM

## 2025-03-05 PROCEDURE — 97810 ACUP 1/> WO ESTIM 1ST 15 MIN: CPT | Performed by: ACUPUNCTURIST

## 2025-03-05 PROCEDURE — 97811 ACUP 1/> W/O ESTIM EA ADD 15: CPT | Performed by: ACUPUNCTURIST

## 2025-03-05 RX ORDER — ATORVASTATIN CALCIUM 40 MG/1
40 TABLET, FILM COATED ORAL DAILY
Qty: 90 TABLET | Refills: 0 | Status: SHIPPED | OUTPATIENT
Start: 2025-03-05

## 2025-03-05 NOTE — PROGRESS NOTES
Acupuncture Visit:     Subjective   Patient ID: Makenna Black is a 84 y.o. female who presents for No chief complaint on file.  Pt without interpretor.  Her pain is constant in low back and right knee.          Session Information  Is this acupuncture treatment being billed to the patient's insurance company: Yes  This is visit number: 8  The patient has a total number of visits of: 12  Initial Acupuncture Treatment date: 01/13/25  Last Treatment date: 04/14/25  Name of Insurance Company: [employer] Complete  Visit Type: Follow-up visit         Review of Systems         Provider reviewed plan for the acupuncture session, precautions and contraindications. Patient/guardian/hospital staff has given consent to treat with full understanding of what to expect during the session. Before acupuncture began, provider explained to the patient to communicate at any time if the procedure was causing discomfort past their tolerance level. Patient agreed to advise acupuncturist. The acupuncturist counseled the patient on the risks of acupuncture treatment including pain, infection, bleeding, and no relief of pain. The patient was positioned comfortably. There was no evidence of infection at the site of needle insertions.    Objective   Physical Exam         Treatment Plan  Treatment Goals: Pain management    Acupuncture Treatment  Needle Guage: 36 guage /.20/ Blue seirin, 40 guage /.16/ Red seirin, 42 guage /.14/ Lime green seirin  Body Points - Left: lr 3, sj5  Body Points - Bilateral: st qix1, k 7, 10  Body Points - Right: li 4, gb 41, sp 9, heding xiyan st 35, sp 10  Other Techniques Utilized: TDP Lamp  TDP Lamp Descripton: feet  Needle Count In: 15  Needle Count Out: 15  Needle Retention Time (min): 25 minutes  Total Face to Face Time (min): 25 minutes              Assessment/Plan

## 2025-03-10 ENCOUNTER — APPOINTMENT (OUTPATIENT)
Dept: INTEGRATIVE MEDICINE | Facility: CLINIC | Age: 85
End: 2025-03-10
Payer: COMMERCIAL

## 2025-03-10 DIAGNOSIS — M54.59 OTHER LOW BACK PAIN: Primary | ICD-10-CM

## 2025-03-10 PROCEDURE — 97811 ACUP 1/> W/O ESTIM EA ADD 15: CPT | Performed by: ACUPUNCTURIST

## 2025-03-10 PROCEDURE — 97810 ACUP 1/> WO ESTIM 1ST 15 MIN: CPT | Performed by: ACUPUNCTURIST

## 2025-03-10 NOTE — PROGRESS NOTES
Acupuncture Visit:     Subjective   Patient ID: Makenna Black is a 84 y.o. female who presents for No chief complaint on file.  9/12+ 8 : VPCY    States she felt very good p tx  Pain level 2/10  Sleep improved    prev  Pt without interpretor.  Her pain is constant in low back and right knee.          Session Information  Is this acupuncture treatment being billed to the patient's insurance company: Yes  This is visit number: 9  The patient has a total number of visits of: 12  Initial Acupuncture Treatment date: 01/13/25  Last Treatment date: 04/14/25  Name of Insurance Company: [employer] Complete  Visit Type: Follow-up visit         Review of Systems         Provider reviewed plan for the acupuncture session, precautions and contraindications. Patient/guardian/hospital staff has given consent to treat with full understanding of what to expect during the session. Before acupuncture began, provider explained to the patient to communicate at any time if the procedure was causing discomfort past their tolerance level. Patient agreed to advise acupuncturist. The acupuncturist counseled the patient on the risks of acupuncture treatment including pain, infection, bleeding, and no relief of pain. The patient was positioned comfortably. There was no evidence of infection at the site of needle insertions.    Objective   Physical Exam              Acupuncture Treatment  Body Points - Left: Lv 3, SJ 5  Body Points - Bilateral: st 36,  k 7, 10  Body Points - Right: li 4, gb 41, sp 9, heding xiyan st 35, sp 10  Other Techniques Utilized: TDP Lamp, Topicals  Topicals Description: R knee with 2 moxa  TDP Lamp Descripton: feet  Needle Count In: 16  Needle Count Out: 16  Total Face to Face Time (min): 25 minutes              Assessment/Plan

## 2025-03-18 ENCOUNTER — APPOINTMENT (OUTPATIENT)
Dept: INTEGRATIVE MEDICINE | Facility: CLINIC | Age: 85
End: 2025-03-18
Payer: COMMERCIAL

## 2025-03-18 ENCOUNTER — HOME VISIT (OUTPATIENT)
Dept: PRIMARY CARE | Facility: CLINIC | Age: 85
End: 2025-03-18

## 2025-03-18 DIAGNOSIS — R42 DIZZINESS: ICD-10-CM

## 2025-03-18 DIAGNOSIS — J44.9 CHRONIC OBSTRUCTIVE PULMONARY DISEASE, UNSPECIFIED COPD TYPE (MULTI): Primary | ICD-10-CM

## 2025-03-18 DIAGNOSIS — M54.59 OTHER LOW BACK PAIN: Primary | ICD-10-CM

## 2025-03-18 PROCEDURE — 99375 HOME HEALTH CARE SUPERVISION: CPT | Performed by: INTERNAL MEDICINE

## 2025-03-18 PROCEDURE — 97810 ACUP 1/> WO ESTIM 1ST 15 MIN: CPT | Performed by: ACUPUNCTURIST

## 2025-03-18 PROCEDURE — 97811 ACUP 1/> W/O ESTIM EA ADD 15: CPT | Performed by: ACUPUNCTURIST

## 2025-03-18 NOTE — PROGRESS NOTES
Acupuncture Visit:     Subjective   Patient ID: Makenna Black is a 84 y.o. female who presents for No chief complaint on file.  1/13/25: 4/14/25  10/12+ 8 : VPCY    Notes continues to feel benefits after acu tx  States less pain when turning her body  Notes BL tops of ankles hurting when walking, feels unstable because of the pain  LEFT foot worse  R knee pn improved    prev  States she felt very good p tx  Pain level 2/10  Sleep improved    prev  Pt without interpretor.  Her pain is constant in low back and right knee.          Session Information  Is this acupuncture treatment being billed to the patient's insurance company: Yes  This is visit number: 10  The patient has a total number of visits of: 12  Initial Acupuncture Treatment date: 01/13/25  Last Treatment date: 04/14/25  Name of Insurance Company: [employer] Complete : VPCY         Review of Systems         Provider reviewed plan for the acupuncture session, precautions and contraindications. Patient/guardian/hospital staff has given consent to treat with full understanding of what to expect during the session. Before acupuncture began, provider explained to the patient to communicate at any time if the procedure was causing discomfort past their tolerance level. Patient agreed to advise acupuncturist. The acupuncturist counseled the patient on the risks of acupuncture treatment including pain, infection, bleeding, and no relief of pain. The patient was positioned comfortably. There was no evidence of infection at the site of needle insertions.    Objective   Physical Exam              Acupuncture Treatment  Body Points - Left: Lv 3, SJ 5  Body Points - Bilateral: st 36,  k 7, 10,   22.03, 22.06 (ankle pn)  Body Points - Right: li 4, gb 41,  st 35, sp 10  Other Techniques Utilized: TDP Lamp, Topicals  Topicals Description: R knee with 2 moxa  TDP Lamp Descripton: feet  Needle Count In: 16  Needle Count Out: 16  Total Face to Face Time (min): 25  minutes              Assessment/Plan

## 2025-03-19 DIAGNOSIS — E55.9 VITAMIN D DEFICIENCY: ICD-10-CM

## 2025-03-19 DIAGNOSIS — J40 BRONCHITIS: ICD-10-CM

## 2025-03-19 DIAGNOSIS — R42 VERTIGO: ICD-10-CM

## 2025-03-19 RX ORDER — NICOTINE 11MG/24HR
PATCH, TRANSDERMAL 24 HOURS TRANSDERMAL DAILY
Qty: 30 CAPSULE | Refills: 0 | Status: SHIPPED | OUTPATIENT
Start: 2025-03-19

## 2025-03-19 RX ORDER — MINERAL OIL
180 ENEMA (ML) RECTAL DAILY
Qty: 30 TABLET | Refills: 2 | Status: SHIPPED | OUTPATIENT
Start: 2025-03-19

## 2025-03-19 RX ORDER — MULTIVITAMIN
2 TABLET ORAL DAILY
Qty: 60 TABLET | Refills: 2 | Status: SHIPPED | OUTPATIENT
Start: 2025-03-19

## 2025-03-24 DIAGNOSIS — I10 ESSENTIAL HYPERTENSION: ICD-10-CM

## 2025-03-24 RX ORDER — ASPIRIN 81 MG/1
81 TABLET ORAL DAILY
Qty: 90 TABLET | Refills: 0 | Status: SHIPPED | OUTPATIENT
Start: 2025-03-24 | End: 2025-06-22

## 2025-03-27 ENCOUNTER — APPOINTMENT (OUTPATIENT)
Dept: INTEGRATIVE MEDICINE | Facility: CLINIC | Age: 85
End: 2025-03-27
Payer: COMMERCIAL

## 2025-03-27 DIAGNOSIS — M54.59 OTHER LOW BACK PAIN: Primary | ICD-10-CM

## 2025-03-27 PROCEDURE — 97811 ACUP 1/> W/O ESTIM EA ADD 15: CPT | Performed by: ACUPUNCTURIST

## 2025-03-27 PROCEDURE — 97810 ACUP 1/> WO ESTIM 1ST 15 MIN: CPT | Performed by: ACUPUNCTURIST

## 2025-03-27 NOTE — PROGRESS NOTES
Acupuncture Visit:     Subjective   Patient ID: Makenna Black is a 84 y.o. female who presents for No chief complaint on file.  Pt seeking continued support for bilateral low back that radiates down lateral aspect of leg into knees.  Acupuncture offers benefit of decreased intensity of pain.      Previous:  1/13/25: 4/14/25  10/12+ 8 : VPCY     Notes continues to feel benefits after acu tx  States less pain when turning her body  Notes BL tops of ankles hurting when walking, feels unstable because of the pain  LEFT foot worse  R knee pn improved          Session Information  Is this acupuncture treatment being billed to the patient's insurance company: Yes  This is visit number: 11  The patient has a total number of visits of: 12  Initial Acupuncture Treatment date: 01/13/25  Last Treatment date: 04/14/25  Name of Insurance Company: [employer] complete  Name of Supervising Physician: YING  Visit Type: Follow-up visit         Review of Systems         Provider reviewed plan for the acupuncture session, precautions and contraindications. Patient/guardian/hospital staff has given consent to treat with full understanding of what to expect during the session. Before acupuncture began, provider explained to the patient to communicate at any time if the procedure was causing discomfort past their tolerance level. Patient agreed to advise acupuncturist. The acupuncturist counseled the patient on the risks of acupuncture treatment including pain, infection, bleeding, and no relief of pain. The patient was positioned comfortably. There was no evidence of infection at the site of needle insertions.    Objective   Physical Exam         Treatment Plan  Treatment Goals: Pain management    Acupuncture Treatment  Needle Guage: 40 guage /.16/ Red seirin, 36 guage /.20/ Blue seirin, 32 guage /.25/ Purple seirin  Body Points - Left: cv2.5  Body Points - Bilateral: k 7, 10, gb 34, st 36, sp 9, heding, xiyan, st 35,  Other  Techniques Utilized: TDP Lamp, Topicals  Topicals Description: warming lotion  TDP Lamp Descripton: david  Needle Count In: 17  Needle Count Out: 17  Needle Retention Time (min): 25 minutes  Total Face to Face Time (min): 25 minutes              Assessment/Plan

## 2025-03-31 ENCOUNTER — APPOINTMENT (OUTPATIENT)
Dept: PRIMARY CARE | Facility: CLINIC | Age: 85
End: 2025-03-31
Payer: COMMERCIAL

## 2025-03-31 VITALS
HEART RATE: 75 BPM | SYSTOLIC BLOOD PRESSURE: 157 MMHG | HEIGHT: 62 IN | BODY MASS INDEX: 33.68 KG/M2 | DIASTOLIC BLOOD PRESSURE: 87 MMHG | WEIGHT: 183 LBS

## 2025-03-31 DIAGNOSIS — G25.0 ESSENTIAL TREMOR: ICD-10-CM

## 2025-03-31 DIAGNOSIS — E66.01 OBESITY, MORBID (MULTI): ICD-10-CM

## 2025-03-31 DIAGNOSIS — M17.9 OSTEOARTHRITIS OF KNEE, UNSPECIFIED LATERALITY, UNSPECIFIED OSTEOARTHRITIS TYPE: ICD-10-CM

## 2025-03-31 DIAGNOSIS — J84.10 PULMONARY FIBROSIS (MULTI): ICD-10-CM

## 2025-03-31 DIAGNOSIS — E13.9 OTHER SPECIFIED DIABETES MELLITUS WITHOUT COMPLICATION, WITHOUT LONG-TERM CURRENT USE OF INSULIN: ICD-10-CM

## 2025-03-31 DIAGNOSIS — D64.9 ANEMIA, UNSPECIFIED TYPE: ICD-10-CM

## 2025-03-31 DIAGNOSIS — I10 ESSENTIAL HYPERTENSION: ICD-10-CM

## 2025-03-31 DIAGNOSIS — I50.43 CHF (CONGESTIVE HEART FAILURE), NYHA CLASS I, ACUTE ON CHRONIC, COMBINED: Primary | ICD-10-CM

## 2025-03-31 DIAGNOSIS — F33.1 MAJOR DEPRESSIVE DISORDER, RECURRENT, MODERATE: ICD-10-CM

## 2025-03-31 DIAGNOSIS — E06.3 HYPOTHYROIDISM DUE TO HASHIMOTO'S THYROIDITIS: ICD-10-CM

## 2025-03-31 DIAGNOSIS — E55.9 VITAMIN D DEFICIENCY: ICD-10-CM

## 2025-03-31 PROCEDURE — G0439 PPPS, SUBSEQ VISIT: HCPCS | Performed by: INTERNAL MEDICINE

## 2025-03-31 PROCEDURE — 3077F SYST BP >= 140 MM HG: CPT | Performed by: INTERNAL MEDICINE

## 2025-03-31 PROCEDURE — G2211 COMPLEX E/M VISIT ADD ON: HCPCS | Performed by: INTERNAL MEDICINE

## 2025-03-31 PROCEDURE — 1170F FXNL STATUS ASSESSED: CPT | Performed by: INTERNAL MEDICINE

## 2025-03-31 PROCEDURE — 1159F MED LIST DOCD IN RCRD: CPT | Performed by: INTERNAL MEDICINE

## 2025-03-31 PROCEDURE — 1160F RVW MEDS BY RX/DR IN RCRD: CPT | Performed by: INTERNAL MEDICINE

## 2025-03-31 PROCEDURE — 3079F DIAST BP 80-89 MM HG: CPT | Performed by: INTERNAL MEDICINE

## 2025-03-31 PROCEDURE — 1036F TOBACCO NON-USER: CPT | Performed by: INTERNAL MEDICINE

## 2025-03-31 PROCEDURE — 1124F ACP DISCUSS-NO DSCNMKR DOCD: CPT | Performed by: INTERNAL MEDICINE

## 2025-03-31 PROCEDURE — 99214 OFFICE O/P EST MOD 30 MIN: CPT | Performed by: INTERNAL MEDICINE

## 2025-03-31 RX ORDER — NEBIVOLOL 10 MG/1
10 TABLET ORAL DAILY
Qty: 90 TABLET | Refills: 1 | Status: SHIPPED | OUTPATIENT
Start: 2025-03-31

## 2025-03-31 RX ORDER — LEVOTHYROXINE SODIUM 100 UG/1
TABLET ORAL
Qty: 120 TABLET | Refills: 1 | Status: SHIPPED | OUTPATIENT
Start: 2025-03-31

## 2025-03-31 RX ORDER — FOLIC ACID 1 MG/1
1 TABLET ORAL DAILY
Qty: 90 TABLET | Refills: 2 | Status: SHIPPED | OUTPATIENT
Start: 2025-03-31

## 2025-03-31 RX ORDER — ACETAMINOPHEN 500 MG
2000 TABLET ORAL DAILY
Qty: 90 CAPSULE | Refills: 1 | Status: SHIPPED | OUTPATIENT
Start: 2025-03-31

## 2025-03-31 RX ORDER — MULTIVIT/IRON SULF/FOLIC ACID 15MG-0.4MG
1 TABLET ORAL DAILY
Qty: 90 TABLET | Refills: 1 | Status: SHIPPED | OUTPATIENT
Start: 2025-03-31

## 2025-03-31 ASSESSMENT — LIFESTYLE VARIABLES
HOW OFTEN DO YOU HAVE A DRINK CONTAINING ALCOHOL: NEVER
HOW OFTEN DO YOU HAVE SIX OR MORE DRINKS ON ONE OCCASION: NEVER
SKIP TO QUESTIONS 9-10: 1
HOW MANY STANDARD DRINKS CONTAINING ALCOHOL DO YOU HAVE ON A TYPICAL DAY: PATIENT DOES NOT DRINK
AUDIT-C TOTAL SCORE: 0

## 2025-03-31 ASSESSMENT — PATIENT HEALTH QUESTIONNAIRE - PHQ9
2. FEELING DOWN, DEPRESSED OR HOPELESS: NOT AT ALL
1. LITTLE INTEREST OR PLEASURE IN DOING THINGS: NOT AT ALL
2. FEELING DOWN, DEPRESSED OR HOPELESS: NOT AT ALL
SUM OF ALL RESPONSES TO PHQ9 QUESTIONS 1 AND 2: 0
SUM OF ALL RESPONSES TO PHQ9 QUESTIONS 1 AND 2: 0
1. LITTLE INTEREST OR PLEASURE IN DOING THINGS: NOT AT ALL

## 2025-03-31 ASSESSMENT — ACTIVITIES OF DAILY LIVING (ADL)
TAKING_MEDICATION: INDEPENDENT
DRESSING: INDEPENDENT
DOING_HOUSEWORK: INDEPENDENT
BATHING: INDEPENDENT
MANAGING_FINANCES: NEEDS ASSISTANCE
GROCERY_SHOPPING: NEEDS ASSISTANCE

## 2025-03-31 ASSESSMENT — ENCOUNTER SYMPTOMS
RESPIRATORY NEGATIVE: 1
CONSTITUTIONAL NEGATIVE: 1
BACK PAIN: 1
CARDIOVASCULAR NEGATIVE: 1
ARTHRALGIAS: 1
GASTROINTESTINAL NEGATIVE: 1

## 2025-03-31 NOTE — PROGRESS NOTES
Chief Complaint:   Chief Complaint   Patient presents with    Medicare Annual Wellness Visit Initial      HPI    The patient is being seen for the subsequent annual wellness visit and follow up.  Past Medical, Surgical and Family History: reviewed and updated in chart.   Interval History: Patient has not been hospitalized previously.   Medications and Supplements: Review of all medications by a prescribing practitioner or clinical pharmacist (such as prescriptions, OTCs, herbal therapies and supplements) documented in the medical record.    No, the patient is not using opioids.   Health Risk Assessment:. Paper HRA completed by patient and scanned into chart.   Depression/Suicide Screening:  .   Done  No falls in the past year.  No recent hospitalizations.  Advance care planning completed.     Past Medical History   Past Medical History:   Diagnosis Date    Body mass index (BMI) 34.0-34.9, adult 08/02/2019    Body mass index (BMI) of 34.0 to 34.9 in adult    Body mass index (BMI) 36.0-36.9, adult 10/05/2021    BMI 36.0-36.9,adult    Body mass index (BMI) 37.0-37.9, adult 07/06/2021    Body mass index (BMI) of 37.0 to 37.9 in adult    Body mass index (BMI) 37.0-37.9, adult 01/04/2022    Body mass index (BMI) of 37.0 to 37.9 in adult    Encounter for screening mammogram for malignant neoplasm of breast     Visit for screening mammogram    Morbid (severe) obesity due to excess calories (Multi) 01/04/2022    Class 2 severe obesity with serious comorbidity and body mass index (BMI) of 37.0 to 37.9 in adult, unspecified obesity type    Morbid (severe) obesity due to excess calories (Multi) 10/05/2021    Class 2 severe obesity with serious comorbidity and body mass index (BMI) of 36.0 to 36.9 in adult, unspecified obesity type    Personal history of other diseases of the digestive system     History of cholelithiasis      Past Surgical History:   History reviewed. No pertinent surgical history.  Family History:   No family  history on file.  Social History:   Tobacco Use: Low Risk  (3/31/2025)    Patient History     Smoking Tobacco Use: Never     Smokeless Tobacco Use: Never     Passive Exposure: Never      Social History     Substance and Sexual Activity   Alcohol Use Not Currently        Allergies:   Allergies   Allergen Reactions    Lisinopril Cough        ROS   Review of Systems   Constitutional: Negative.    Respiratory: Negative.     Cardiovascular: Negative.    Gastrointestinal: Negative.    Musculoskeletal:  Positive for arthralgias, back pain and gait problem.        Objective   Vitals:    03/31/25 0938   BP: 157/87   Pulse: 75      BMI Readings from Last 15 Encounters:   03/31/25 33.47 kg/m²   12/17/24 34.57 kg/m²   09/15/24 33.84 kg/m²   09/10/24 36.36 kg/m²   08/21/24 36.52 kg/m²   03/19/24 36.52 kg/m²   01/31/24 36.91 kg/m²   12/01/23 36.91 kg/m²   08/25/23 36.33 kg/m²   08/14/23 36.33 kg/m²   01/17/23 37.69 kg/m²   10/11/22 36.13 kg/m²   08/30/22 35.94 kg/m²   07/12/22 35.94 kg/m²   05/19/22 37.89 kg/m²      83 kg (183 lb) (3/31/2025  9:38 AM)      Physical Exam  Physical Exam  Constitutional:       Appearance: She is well-developed.   Cardiovascular:      Rate and Rhythm: Normal rate and regular rhythm.      Heart sounds: Murmur heard.   Pulmonary:      Effort: Pulmonary effort is normal.      Breath sounds: Rales present.   Abdominal:      General: Bowel sounds are normal.      Palpations: Abdomen is soft.          Labs:  CBC:  Recent Labs     01/10/25  1338 09/03/24  1058 01/31/24  1412   WBC 9.1 6.1 7.9   HGB 13.4 14.5 13.9   HCT 41.8 44.5 43.5    275 327   MCV 93 94 92     CMP:  Recent Labs     01/10/25  1338 09/03/24  1058 01/31/24  1412    141 142   K 4.5 4.7 4.3    103 104   CO2 26 29 28   ANIONGAP 13 14 14   BUN 15 16 10   CREATININE 0.62 0.68 0.58   EGFR 88 87 90   GLUCOSE 100* 101* 88     Recent Labs     01/10/25  1338 09/03/24  1058 01/31/24  1412   ALBUMIN 3.9 4.5 4.2   ALKPHOS 30* 42 47  "  ALT 20 27 26   AST 16 20 19   BILITOT 0.4 0.4 0.3     Calcium/Phos:   Lab Results   Component Value Date    CALCIUM 9.3 01/10/2025      COAG: No results for input(s): \"INR\", \"DDIMERVTE\" in the last 43923 hours.  CRP:   Lab Results   Component Value Date    CRP 1.19 (A) 01/20/2021      [unfilled]   ENDO:  Recent Labs     09/03/24  1058 01/31/24  1412 08/14/23  1047 01/17/23  1207   TSH 0.90 0.59 0.50 0.63   HGBA1C 5.6 5.7* 5.4 5.6      CARDIAC: No results for input(s): \"LDH\", \"CKMB\", \"TROPHS\", \"BNP\" in the last 27242 hours.    No lab exists for component: \"CK\", \"CKMBP\"  Recent Labs     09/03/24  1058 01/31/24  1412 08/14/23  1047 10/11/22  1054 04/08/22  1128   CHOL 246* 316* 247* 234* 301*   LDLF  --   --  139* 130* 195*   LDLCALC 151* 211  --   --   --    HDL 75.7 71.4 77.3 80.4 76.0   TRIG 98 166* 152* 119 148     No data recorded    Current Medications:  Current Outpatient Medications   Medication Sig Dispense Refill    fexofenadine (Allegra) 180 mg tablet TAKE 1 TABLET (180 MG) BY MOUTH ONCE DAILY. 30 tablet 2    aspirin 81 mg EC tablet TAKE 1 TABLET (81 MG) BY MOUTH ONCE DAILY. WITH FOOD 90 tablet 0    atorvastatin (Lipitor) 40 mg tablet TAKE 1 TABLET (40 MG) BY MOUTH ONCE DAILY. 90 tablet 0    blood pressure monitor kit 1 each once daily. 1 kit 0    calcium carbonate-vitamin D3 600 mg-10 mcg (400 unit) tablet TAKE 2 TABLETS BY MOUTH ONCE DAILY. 60 tablet 2    cholecalciferol (Vitamin D3) 50 mcg (2,000 unit) capsule Take 1 capsule (50 mcg) by mouth once daily. 90 capsule 1    ciprofloxacin (Cetraxal) 0.2 % otic solution Administer 0.25 mL into affected ear(s) in the morning and 0.25 mL at noon and 0.25 mL in the evening and 0.25 mL before bedtime.      diclofenac sodium 1 % kit Place 1 each on the skin.      diclofenac sodium 3 % gel Apply 1 Application topically 2 times a day as needed (Pain). 100 g 2    docusate sodium (Colace) 100 mg capsule Take 1 capsule (100 mg) by mouth in the morning and 1 " capsule (100 mg) before bedtime.      DULoxetine (Cymbalta) 30 mg DR capsule Take 1 capsule (30 mg) by mouth once daily. 90 capsule 0    FeroSuL tablet TAKE 1 TABLET BY MOUTH ONCE DAILY. 30 tablet 2    fluticasone (Flonase) 50 mcg/actuation nasal spray ADMINISTER 1 SPRAY INTO EACH NOSTRIL ONCE DAILY. 16 g 1    fluticasone propion-salmeteroL (Advair Diskus) 250-50 mcg/dose diskus inhaler Inhale 1 puff.      folic acid (Folvite) 1 mg tablet Take 1 tablet (1 mg) by mouth once daily. 90 tablet 2    glucosamine armas 2KCl-chondroit 750-600 mg tablet Take 1 each by mouth.      ibuprofen 600 mg tablet TAKE 1 TABLET (600 MG) BY MOUTH 3 TIMES A DAY AS NEEDED FOR MILD PAIN (1 - 3) (PAIN). 60 tablet 0    ipratropium (Atrovent HFA) 17 mcg/actuation inhaler Inhale 1 puff  in the morning and 1 puff at noon and 1 puff in the evening and 1 puff before bedtime.      ketorolac (Acular) 0.5 % ophthalmic solution Administer into both eyes 4 times a day.      levomilnacipranR (Fetzima) 40 mg extended release capsule Take 1 capsule (40 mg) by mouth once daily. 30 capsule 2    levothyroxine (Synthroid, Levoxyl) 100 mcg tablet Take 1.5 tablets 3 times per week and one tablet 4 days per week 120 tablet 1    loratadine (Claritin) 10 mg tablet Take 1 tablet (10 mg) by mouth once daily. 90 tablet 0    meclizine (Antivert) 25 mg tablet TAKE 1 TABLET 3 TIMES DAILY AS NEEDED FOR DIZZINESS 90 tablet 0    methotrexate (Trexall) 2.5 mg tablet Take 1 tablet (2.5 mg total) by mouth 1 (one) time per week.      multivit-iron sulf-folic acid (Tab-A-Christel Multivitamin w-iron) 15 mg iron- 400 mcg tablet Take 1 tablet by mouth once daily. 90 tablet 1    nebivolol (Bystolic) 10 mg tablet Take 1 tablet (10 mg) by mouth once daily. 90 tablet 1    olopatadine (Patanol) 0.1 % ophthalmic solution Administer 1 drop into both eyes in the morning and 1 drop before bedtime.      omeprazole (PriLOSEC) 20 mg DR capsule Take 1 capsule (20 mg) by mouth once daily.       polyvinyl alcohol (Liquifilm Tears) 1.4 % ophthalmic solution Administer 1 drop into both eyes if needed.      predniSONE (Deltasone) 10 mg tablet Take 1 tablet (10 mg) by mouth once daily.      predniSONE (Deltasone) 20 mg tablet Take 1 tablet (20 mg) by mouth once daily.      propranolol (Inderal) 10 mg tablet TAKE 1 TABLET (10 MG) BY MOUTH 2 TIMES A DAY. 60 tablet 2    VITAMIN A ORAL Take 1 tablet by mouth once daily.       No current facility-administered medications for this visit.       Assessment and Plan  Makenna was seen today for medicare annual wellness visit initial.  Diagnoses and all orders for this visit:  CHF (congestive heart failure), NYHA class I, acute on chronic, combined (Primary)  Other specified diabetes mellitus without complication, without long-term current use of insulin  Pulmonary fibrosis (Multi)  Major depressive disorder, recurrent, moderate  Obesity, morbid (Multi)  Hypothyroidism due to Hashimoto's thyroiditis  -     levothyroxine (Synthroid, Levoxyl) 100 mcg tablet; Take 1.5 tablets 3 times per week and one tablet 4 days per week  -     nebivolol (Bystolic) 10 mg tablet; Take 1 tablet (10 mg) by mouth once daily.  Anemia, unspecified type  -     folic acid (Folvite) 1 mg tablet; Take 1 tablet (1 mg) by mouth once daily.  -     multivit-iron sulf-folic acid (Tab-A-Christel Multivitamin w-iron) 15 mg iron- 400 mcg tablet; Take 1 tablet by mouth once daily.  Essential hypertension  -     nebivolol (Bystolic) 10 mg tablet; Take 1 tablet (10 mg) by mouth once daily.  Essential tremor  -     nebivolol (Bystolic) 10 mg tablet; Take 1 tablet (10 mg) by mouth once daily.  Osteoarthritis of knee, unspecified laterality, unspecified osteoarthritis type  -     nebivolol (Bystolic) 10 mg tablet; Take 1 tablet (10 mg) by mouth once daily.  Vitamin D deficiency  -     cholecalciferol (Vitamin D3) 50 mcg (2,000 unit) capsule; Take 1 capsule (50 mcg) by mouth once daily.       Immunizations:  Immunization  History   Administered Date(s) Administered    COVID-19, mRNA, LNP-S, PF, 30 mcg/0.3 mL dose 11/24/2021, 12/17/2021    Flu vaccine, trivalent, preservative free, HIGH-DOSE, age 65y+ (Fluzone) 11/28/2016, 11/07/2017, 08/28/2018, 09/01/2020    Influenza, Unspecified 11/08/2011    Influenza, seasonal, injectable 09/24/2013    Pneumococcal conjugate vaccine, 13-valent (PREVNAR 13) 04/05/2017    Pneumococcal polysaccharide vaccine, 23-valent, age 2 years and older (PNEUMOVAX 23) 01/15/2018    Pneumococcal, Unspecified 11/16/2016         Medicare Wellness Billing Compliance Satisfied    *This is a visual tool to show completion of required items on the day of the visit. Green checks will only appear on the date of visit.    Review all medications by prescribing practitioner or clinical pharmacist (such as prescriptions, OTCs, herbal therapies and supplements) documented in the medical record    Past Medical, Surgical, and Family History reviewed and updated in chart    Tobacco Use Reviewed    Alcohol Use Reviewed    Illicit Drug Use Reviewed    PHQ2/9    Falls in Last Year Reviewed    Home Safety Risk Factors Reviewed    Cognitive Impairment Reviewed    Patient Self Assessment and Health Status    Current Diet Reviewed    Exercise Frequency    ADL - Hearing Impairment    ADL - Bathing    ADL - Dressing    ADL - Walks in Home    IADL - Managing Finances    IADL - Grocery Shopping    IADL - Taking Medications    IADL - Doing Housework    Vision Screening

## 2025-04-09 ENCOUNTER — ALLIED HEALTH (OUTPATIENT)
Dept: INTEGRATIVE MEDICINE | Facility: CLINIC | Age: 85
End: 2025-04-09
Payer: COMMERCIAL

## 2025-04-09 DIAGNOSIS — M54.59 OTHER LOW BACK PAIN: Primary | ICD-10-CM

## 2025-04-09 PROCEDURE — 97811 ACUP 1/> W/O ESTIM EA ADD 15: CPT | Performed by: NATUROPATH

## 2025-04-09 PROCEDURE — 97810 ACUP 1/> WO ESTIM 1ST 15 MIN: CPT | Performed by: NATUROPATH

## 2025-04-09 NOTE — PROGRESS NOTES
Acupuncture Visit:     Subjective   Patient ID: Makenna Black is a 84 y.o. female who presents for No chief complaint on file.  FU for low back and knee pain.   Rates at 2/10 constant recently  Pain is significantly reduced over past, less sharp pain less sciatic pain.   Does experience some fatigue.        Pt seeking continued support for bilateral low back that radiates down lateral aspect of leg into knees.  Acupuncture offers benefit of decreased intensity of pain.          Previous:  1/13/25: 4/14/25  10/12+ 8 : [facility]     Notes continues to feel benefits after acu tx  States less pain when turning her body  Notes BL tops of ankles hurting when walking, feels unstable because of the pain  LEFT foot worse  R knee pn improved                  Review of Systems         Provider reviewed plan for the acupuncture session, precautions and contraindications. Patient/guardian/hospital staff has given consent to treat with full understanding of what to expect during the session. Before acupuncture began, provider explained to the patient to communicate at any time if the procedure was causing discomfort past their tolerance level. Patient agreed to advise acupuncturist. The acupuncturist counseled the patient on the risks of acupuncture treatment including pain, infection, bleeding, and no relief of pain. The patient was positioned comfortably. There was no evidence of infection at the site of needle insertions.    Objective   Physical Exam                             Assessment/Plan        all orders for this visit:  Other low back pain (Primary)

## 2025-04-14 DIAGNOSIS — J84.10 PULMONARY FIBROSIS (MULTI): ICD-10-CM

## 2025-04-14 RX ORDER — FLUTICASONE PROPIONATE 50 MCG
1 SPRAY, SUSPENSION (ML) NASAL DAILY
Qty: 16 G | Refills: 2 | Status: SHIPPED | OUTPATIENT
Start: 2025-04-14 | End: 2025-06-13

## 2025-05-07 DIAGNOSIS — G25.0 ESSENTIAL TREMOR: ICD-10-CM

## 2025-05-07 RX ORDER — PROPRANOLOL HYDROCHLORIDE 10 MG/1
10 TABLET ORAL 2 TIMES DAILY
Qty: 60 TABLET | Refills: 3 | Status: SHIPPED | OUTPATIENT
Start: 2025-05-07 | End: 2025-08-05

## 2025-05-16 ENCOUNTER — APPOINTMENT (OUTPATIENT)
Dept: INTEGRATIVE MEDICINE | Facility: CLINIC | Age: 85
End: 2025-05-16
Payer: COMMERCIAL

## 2025-05-16 DIAGNOSIS — M54.59 OTHER LOW BACK PAIN: Primary | ICD-10-CM

## 2025-05-16 PROCEDURE — 97811 ACUP 1/> W/O ESTIM EA ADD 15: CPT | Performed by: NATUROPATH

## 2025-05-16 PROCEDURE — 97810 ACUP 1/> WO ESTIM 1ST 15 MIN: CPT | Performed by: NATUROPATH

## 2025-05-16 NOTE — PROGRESS NOTES
Acupuncture Visit:     Subjective   Patient ID: Makenna Black is a 84 y.o. female who presents for No chief complaint on file.  FU for low back and knee pain.   Rates at 2/10 constant recently  Pain is significantly reduced over past, less sharp pain less sciatic pain.   Does experience some fatigue.      previous  Pt seeking continued support for bilateral low back that radiates down lateral aspect of leg into knees.  Acupuncture offers benefit of decreased intensity of pain.          Previous:  1/13/25: 4/14/25  10/12+ 8 : [facility]     Notes continues to feel benefits after acu tx  States less pain when turning her body  Notes BL tops of ankles hurting when walking, feels unstable because of the pain  LEFT foot worse  R knee pn improved          Session Information  Is this acupuncture treatment being billed to the patient's insurance company: Yes  This is visit number: 1  The patient has a total number of visits of: 8  Name of Insurance Company: [employer] dual complete  Name of Supervising Physician: NA  Visit Type: Follow-up visit         Review of Systems         Provider reviewed plan for the acupuncture session, precautions and contraindications. Patient/guardian/hospital staff has given consent to treat with full understanding of what to expect during the session. Before acupuncture began, provider explained to the patient to communicate at any time if the procedure was causing discomfort past their tolerance level. Patient agreed to advise acupuncturist. The acupuncturist counseled the patient on the risks of acupuncture treatment including pain, infection, bleeding, and no relief of pain. The patient was positioned comfortably. There was no evidence of infection at the site of needle insertions.    Objective   Physical Exam               Acupuncture Treatment   Patient Position Supine on a table       Acupuncture Needling Yes       Needle Guage 36 guage /.20/ Blue seirin       Body Points With retention        Body Points - Bilateral Du 20, Li4, Si3, SP6, KI7, , Gb40, LR3, St44       Auricular Points Yes       Other Techniques Utilized TDP Lamp       TDP Lamp Descripton feet       Needle Count In 15       Needle Count Out 15       Needle Retention Time (min) 25 minutes       Total Face to Face Time (min) 25 minutes                                 Assessment/Plan   Diagnoses and all orders for this visit:  Other low back pain (Primary)

## 2025-05-19 ENCOUNTER — HOME VISIT (OUTPATIENT)
Dept: PRIMARY CARE | Facility: CLINIC | Age: 85
End: 2025-05-19
Payer: COMMERCIAL

## 2025-05-19 DIAGNOSIS — J44.9 CHRONIC OBSTRUCTIVE PULMONARY DISEASE, UNSPECIFIED COPD TYPE (MULTI): Primary | ICD-10-CM

## 2025-05-19 DIAGNOSIS — R42 DIZZINESS: ICD-10-CM

## 2025-06-03 DIAGNOSIS — E55.9 VITAMIN D DEFICIENCY: ICD-10-CM

## 2025-06-03 DIAGNOSIS — J40 BRONCHITIS: ICD-10-CM

## 2025-06-03 DIAGNOSIS — E78.5 HYPERLIPIDEMIA, UNSPECIFIED HYPERLIPIDEMIA TYPE: ICD-10-CM

## 2025-06-03 DIAGNOSIS — R42 VERTIGO: ICD-10-CM

## 2025-06-03 RX ORDER — ATORVASTATIN CALCIUM 40 MG/1
40 TABLET, FILM COATED ORAL DAILY
Qty: 90 TABLET | Refills: 1 | Status: SHIPPED | OUTPATIENT
Start: 2025-06-03

## 2025-06-03 RX ORDER — FEXOFENADINE HCL 180 MG/1
180 TABLET ORAL DAILY
Qty: 30 TABLET | Refills: 3 | Status: SHIPPED | OUTPATIENT
Start: 2025-06-03

## 2025-06-03 RX ORDER — MULTIVITAMIN
2 TABLET ORAL DAILY
Qty: 60 TABLET | Refills: 3 | Status: SHIPPED | OUTPATIENT
Start: 2025-06-03

## 2025-06-16 ENCOUNTER — APPOINTMENT (OUTPATIENT)
Dept: INTEGRATIVE MEDICINE | Facility: CLINIC | Age: 85
End: 2025-06-16
Payer: COMMERCIAL

## 2025-06-16 DIAGNOSIS — M54.59 OTHER LOW BACK PAIN: Primary | ICD-10-CM

## 2025-06-16 DIAGNOSIS — M17.11 OSTEOARTHRITIS OF RIGHT KNEE, UNSPECIFIED OSTEOARTHRITIS TYPE: ICD-10-CM

## 2025-06-16 PROCEDURE — 97810 ACUP 1/> WO ESTIM 1ST 15 MIN: CPT | Performed by: ACUPUNCTURIST

## 2025-06-16 PROCEDURE — 97811 ACUP 1/> W/O ESTIM EA ADD 15: CPT | Performed by: ACUPUNCTURIST

## 2025-06-16 NOTE — PROGRESS NOTES
Acupuncture Visit:     Subjective   Patient ID: Makenna Black is a 84 y.o. female who presents for No chief complaint on file.  FU for low back and knee pain.   Rates at 2-3/10 constant recently  Pain is significantly reduced over past, less sharp pain less sciatic pain.   Pain increases at night time       previous  Pt seeking continued support for bilateral low back that radiates down lateral aspect of leg into knees.  Acupuncture offers benefit of decreased intensity of pain.          Previous:  1/13/25: 4/14/25  10/12+ 8 : [facility]     Notes continues to feel benefits after acu tx  States less pain when turning her body  Notes BL tops of ankles hurting when walking, feels unstable because of the pain  LEFT foot worse  R knee pn improved          Session Information  Is this acupuncture treatment being billed to the patient's insurance company: Yes  This is visit number: 2  The patient has a total number of visits of: 8  Initial Acupuncture Treatment date: 01/13/25  Last Treatment date: 04/14/25  Name of Insurance Company: [employer] dual complete  Name of Supervising Physician: NA  Visit Type: Follow-up visit         Review of Systems         Provider reviewed plan for the acupuncture session, precautions and contraindications. Patient/guardian/hospital staff has given consent to treat with full understanding of what to expect during the session. Before acupuncture began, provider explained to the patient to communicate at any time if the procedure was causing discomfort past their tolerance level. Patient agreed to advise acupuncturist. The acupuncturist counseled the patient on the risks of acupuncture treatment including pain, infection, bleeding, and no relief of pain. The patient was positioned comfortably. There was no evidence of infection at the site of needle insertions.    Objective   Physical Exam               Acupuncture Treatment   Patient Position Supine on a table       Acupuncture Needling Yes        Needle Guage 36 guage /.20/ Blue seirin       Body Points With retention       Body Points - Bilateral Du 20, Li4, Si3, SP6, KI7, , Gb40, LR3, St44       Auricular Points Yes       Other Techniques Utilized TDP Lamp       TDP Lamp Descripton feet       Needle Count In 15       Needle Count Out 15       Needle Retention Time (min) 25 minutes       Total Face to Face Time (min) 25 minutes                  Acupuncture Treatment  Patient Position: Supine on a table  Acupuncture Needling: Yes  Needle Guage: 36 guage /.20/ Blue seirin  Body Points: With retention  Body Points - Bilateral: LU9 LU5 LI4/LR3 He ding SP6 KI7 GB40 ST44  Other Techniques Utilized: TDP Lamp  TDP Lamp Descripton: feet  Needle Count In: 18  Needle Count Out: 18  Total Face to Face Time (min): 25 minutes              Assessment/Plan   There are no diagnoses linked to this encounter.

## 2025-06-24 DIAGNOSIS — I10 ESSENTIAL HYPERTENSION: ICD-10-CM

## 2025-06-25 RX ORDER — ASPIRIN 81 MG/1
81 TABLET ORAL DAILY
Qty: 90 TABLET | Refills: 0 | Status: SHIPPED | OUTPATIENT
Start: 2025-06-25 | End: 2025-09-23

## 2025-07-01 DIAGNOSIS — J84.10 PULMONARY FIBROSIS (MULTI): ICD-10-CM

## 2025-07-02 RX ORDER — FLUTICASONE PROPIONATE 50 MCG
1 SPRAY, SUSPENSION (ML) NASAL DAILY
Qty: 16 G | Refills: 2 | Status: SHIPPED | OUTPATIENT
Start: 2025-07-02 | End: 2025-08-31

## 2025-07-08 DIAGNOSIS — D64.9 ANEMIA, UNSPECIFIED TYPE: ICD-10-CM

## 2025-07-08 RX ORDER — MULTIVIT/IRON SULF/FOLIC ACID 15MG-0.4MG
1 TABLET ORAL DAILY
Qty: 30 TABLET | Refills: 2 | Status: SHIPPED | OUTPATIENT
Start: 2025-07-08

## 2025-07-14 ENCOUNTER — APPOINTMENT (OUTPATIENT)
Dept: INTEGRATIVE MEDICINE | Facility: CLINIC | Age: 85
End: 2025-07-14
Payer: COMMERCIAL

## 2025-07-14 DIAGNOSIS — M54.59 OTHER LOW BACK PAIN: Primary | ICD-10-CM

## 2025-07-14 PROCEDURE — 97810 ACUP 1/> WO ESTIM 1ST 15 MIN: CPT | Performed by: ACUPUNCTURIST

## 2025-07-14 PROCEDURE — 97811 ACUP 1/> W/O ESTIM EA ADD 15: CPT | Performed by: ACUPUNCTURIST

## 2025-07-14 NOTE — PROGRESS NOTES
Acupuncture Visit:     Subjective   Patient ID: Makenna Black is a 84 y.o. female who presents for No chief complaint on file.  FU for low back and knee pain.   Rates at 2/10 constant recently in her LB and R knee   Pain is significantly reduced over past, less sharp pain less sciatic pain.   Pain increases at night time     prev  previous  Pt seeking continued support for bilateral low back that radiates down lateral aspect of leg into knees.  Acupuncture offers benefit of decreased intensity of pain.          Previous:  1/13/25: 4/14/25  10/12+ 8 : [facility]     Notes continues to feel benefits after acu tx  States less pain when turning her body  Notes BL tops of ankles hurting when walking, feels unstable because of the pain  LEFT foot worse  R knee pn improved          Session Information  Is this acupuncture treatment being billed to the patient's insurance company: Yes  This is visit number: 3  The patient has a total number of visits of: 8  Initial Acupuncture Treatment date: 01/13/25  Last Treatment date: 04/14/25  Name of Insurance Company: [employer] dual complete  Name of Supervising Physician: YING  Visit Type: Follow-up visit         Review of Systems         Provider reviewed plan for the acupuncture session, precautions and contraindications. Patient/guardian/hospital staff has given consent to treat with full understanding of what to expect during the session. Before acupuncture began, provider explained to the patient to communicate at any time if the procedure was causing discomfort past their tolerance level. Patient agreed to advise acupuncturist. The acupuncturist counseled the patient on the risks of acupuncture treatment including pain, infection, bleeding, and no relief of pain. The patient was positioned comfortably. There was no evidence of infection at the site of needle insertions.    Objective   Physical Exam               Acupuncture Treatment   Patient Position Supine on a table        Acupuncture Needling Yes       Needle Guage 36 guage /.20/ Blue seirin       Body Points With retention       Body Points - Bilateral Du 20, Li4, Si3, SP6, KI7, , Gb40, LR3, St44       Auricular Points Yes       Other Techniques Utilized TDP Lamp       TDP Lamp Descripton feet       Needle Count In 15       Needle Count Out 15       Needle Retention Time (min) 25 minutes       Total Face to Face Time (min) 25 minutes                  Acupuncture Treatment  Patient Position: Supine on a table  Acupuncture Needling: Yes  Needle Guage: 36 guage /.20/ Blue seirin  Body Points: With retention  Body Points - Left: 77.17 77.18 77.19 77.21 - 22.06 SI3  Body Points - Bilateral: DU20 LR8 LI11 KI6 BL62  Body Points - Right: DB/LG  Other Techniques Utilized: TDP Lamp  TDP Lamp Descripton: feet  Needle Count In: 17  Needle Count Out: 17              Assessment/Plan   Diagnoses and all orders for this visit:  Other low back pain (Primary)

## 2025-07-22 ENCOUNTER — APPOINTMENT (OUTPATIENT)
Dept: PRIMARY CARE | Facility: CLINIC | Age: 85
End: 2025-07-22
Payer: COMMERCIAL

## 2025-07-22 VITALS
HEIGHT: 62 IN | HEART RATE: 78 BPM | DIASTOLIC BLOOD PRESSURE: 95 MMHG | SYSTOLIC BLOOD PRESSURE: 147 MMHG | BODY MASS INDEX: 33.31 KG/M2 | WEIGHT: 181 LBS

## 2025-07-22 DIAGNOSIS — R73.01 IFG (IMPAIRED FASTING GLUCOSE): ICD-10-CM

## 2025-07-22 DIAGNOSIS — I50.43 CHF (CONGESTIVE HEART FAILURE), NYHA CLASS I, ACUTE ON CHRONIC, COMBINED: ICD-10-CM

## 2025-07-22 DIAGNOSIS — M25.612 STIFFNESS OF LEFT SHOULDER JOINT: ICD-10-CM

## 2025-07-22 DIAGNOSIS — M54.42 ACUTE LOW BACK PAIN WITH BILATERAL SCIATICA, UNSPECIFIED BACK PAIN LATERALITY: ICD-10-CM

## 2025-07-22 DIAGNOSIS — E61.1 IRON DEFICIENCY: ICD-10-CM

## 2025-07-22 DIAGNOSIS — E06.3 HYPOTHYROIDISM DUE TO HASHIMOTO'S THYROIDITIS: ICD-10-CM

## 2025-07-22 DIAGNOSIS — G25.0 ESSENTIAL TREMOR: ICD-10-CM

## 2025-07-22 DIAGNOSIS — R42 VERTIGO: ICD-10-CM

## 2025-07-22 DIAGNOSIS — E55.9 VITAMIN D DEFICIENCY: ICD-10-CM

## 2025-07-22 DIAGNOSIS — E78.5 HYPERLIPIDEMIA, UNSPECIFIED HYPERLIPIDEMIA TYPE: ICD-10-CM

## 2025-07-22 DIAGNOSIS — J84.10 PULMONARY FIBROSIS (MULTI): ICD-10-CM

## 2025-07-22 DIAGNOSIS — E06.3 HYPOTHYROIDISM DUE TO HASHIMOTO THYROIDITIS: ICD-10-CM

## 2025-07-22 DIAGNOSIS — M54.41 ACUTE LOW BACK PAIN WITH BILATERAL SCIATICA, UNSPECIFIED BACK PAIN LATERALITY: ICD-10-CM

## 2025-07-22 DIAGNOSIS — M17.9 OSTEOARTHRITIS OF KNEE, UNSPECIFIED LATERALITY, UNSPECIFIED OSTEOARTHRITIS TYPE: ICD-10-CM

## 2025-07-22 DIAGNOSIS — J40 BRONCHITIS: ICD-10-CM

## 2025-07-22 DIAGNOSIS — I10 ESSENTIAL HYPERTENSION: Primary | ICD-10-CM

## 2025-07-22 PROCEDURE — 1159F MED LIST DOCD IN RCRD: CPT | Performed by: INTERNAL MEDICINE

## 2025-07-22 PROCEDURE — 3080F DIAST BP >= 90 MM HG: CPT | Performed by: INTERNAL MEDICINE

## 2025-07-22 PROCEDURE — G2211 COMPLEX E/M VISIT ADD ON: HCPCS | Performed by: INTERNAL MEDICINE

## 2025-07-22 PROCEDURE — 99214 OFFICE O/P EST MOD 30 MIN: CPT | Performed by: INTERNAL MEDICINE

## 2025-07-22 PROCEDURE — 1036F TOBACCO NON-USER: CPT | Performed by: INTERNAL MEDICINE

## 2025-07-22 PROCEDURE — 3077F SYST BP >= 140 MM HG: CPT | Performed by: INTERNAL MEDICINE

## 2025-07-22 ASSESSMENT — LIFESTYLE VARIABLES
HOW OFTEN DO YOU HAVE A DRINK CONTAINING ALCOHOL: NEVER
SKIP TO QUESTIONS 9-10: 1
HOW MANY STANDARD DRINKS CONTAINING ALCOHOL DO YOU HAVE ON A TYPICAL DAY: PATIENT DOES NOT DRINK
HOW OFTEN DO YOU HAVE SIX OR MORE DRINKS ON ONE OCCASION: NEVER
AUDIT-C TOTAL SCORE: 0

## 2025-07-22 ASSESSMENT — ENCOUNTER SYMPTOMS
COUGH: 0
RHINORRHEA: 0
RECTAL PAIN: 0
COLOR CHANGE: 0
APPETITE CHANGE: 0
BRUISES/BLEEDS EASILY: 0
APNEA: 0
SEIZURES: 0
SLEEP DISTURBANCE: 0
NUMBNESS: 0
PHOTOPHOBIA: 0
HEMATURIA: 0
DYSURIA: 0
HALLUCINATIONS: 0
NERVOUS/ANXIOUS: 0
DIFFICULTY URINATING: 0
HEADACHES: 0
WOUND: 0
NECK STIFFNESS: 0
MYALGIAS: 0
EYE REDNESS: 0
SINUS PAIN: 0
PALPITATIONS: 0
UNEXPECTED WEIGHT CHANGE: 0
ABDOMINAL PAIN: 0
ARTHRALGIAS: 0
TROUBLE SWALLOWING: 0
FACIAL SWELLING: 0
SINUS PRESSURE: 0
POLYDIPSIA: 0
CONSTIPATION: 0
DIAPHORESIS: 0
FLANK PAIN: 0
SORE THROAT: 0
FEVER: 0
HYPERACTIVE: 0
EYE ITCHING: 0
STRIDOR: 0
BLOOD IN STOOL: 0
AGITATION: 0
VOICE CHANGE: 0
SPEECH DIFFICULTY: 0
EYE DISCHARGE: 0
TREMORS: 0
ABDOMINAL DISTENTION: 0
DIZZINESS: 0
EYE PAIN: 0
NECK PAIN: 0
CHOKING: 0
POLYPHAGIA: 0
ADENOPATHY: 0
DECREASED CONCENTRATION: 0
ANAL BLEEDING: 0
WHEEZING: 0
SHORTNESS OF BREATH: 0
ACTIVITY CHANGE: 0
FATIGUE: 0
DIARRHEA: 0
DYSPHORIC MOOD: 0
CONFUSION: 0
BACK PAIN: 0
LIGHT-HEADEDNESS: 0
VOMITING: 0
WEAKNESS: 0
NAUSEA: 0
JOINT SWELLING: 0
CHILLS: 0
FACIAL ASYMMETRY: 0
FREQUENCY: 0
CHEST TIGHTNESS: 0

## 2025-07-22 NOTE — PROGRESS NOTES
Chief Complaint:      Makenna Black is a 84 y.o. year old female is here for follow-up.     HPI -    Makenna Black is here for follow up on chronic conditions.  Reports she has pain in Left Shoulder, that has been progressively getting worsened.  Compliant with medications.  Denies side effects.  Needs refills on medications.  Chart reviewed, pharmacy updated, prior notes, labs, imaging, EKGs reviewed.    Past Medical History   Medical History[1]   Past Surgical History:   Surgical History[2]  Family History:   Family History[3]  Social History:   Tobacco Use: Low Risk  (7/22/2025)    Patient History     Smoking Tobacco Use: Never     Smokeless Tobacco Use: Never     Passive Exposure: Never      Social History     Substance and Sexual Activity   Alcohol Use Not Currently        Allergies:   RX Allergies[4]     ROS   Review of Systems   Constitutional:  Negative for activity change, appetite change, chills, diaphoresis, fatigue, fever and unexpected weight change.   HENT:  Negative for congestion, dental problem, drooling, ear discharge, ear pain, facial swelling, hearing loss, mouth sores, nosebleeds, postnasal drip, rhinorrhea, sinus pressure, sinus pain, sneezing, sore throat, tinnitus, trouble swallowing and voice change.    Eyes:  Negative for photophobia, pain, discharge, redness, itching and visual disturbance.   Respiratory:  Negative for apnea, cough, choking, chest tightness, shortness of breath, wheezing and stridor.    Cardiovascular:  Negative for chest pain, palpitations and leg swelling.   Gastrointestinal:  Negative for abdominal distention, abdominal pain, anal bleeding, blood in stool, constipation, diarrhea, nausea, rectal pain and vomiting.   Endocrine: Negative for cold intolerance, heat intolerance, polydipsia, polyphagia and polyuria.   Genitourinary:  Negative for decreased urine volume, difficulty urinating, dyspareunia, dysuria, enuresis, flank pain, frequency, genital sores, hematuria, menstrual  problem, pelvic pain, urgency, vaginal bleeding, vaginal discharge and vaginal pain.   Musculoskeletal:  Negative for arthralgias, back pain, gait problem, joint swelling, myalgias, neck pain and neck stiffness.   Skin:  Negative for color change, pallor, rash and wound.   Allergic/Immunologic: Negative for environmental allergies, food allergies and immunocompromised state.   Neurological:  Negative for dizziness, tremors, seizures, syncope, facial asymmetry, speech difficulty, weakness, light-headedness, numbness and headaches.   Hematological:  Negative for adenopathy. Does not bruise/bleed easily.   Psychiatric/Behavioral:  Negative for agitation, behavioral problems, confusion, decreased concentration, dysphoric mood, hallucinations, self-injury, sleep disturbance and suicidal ideas. The patient is not nervous/anxious and is not hyperactive.         Objective   Vitals:    07/22/25 1108   BP: (!) 147/95   Pulse: 78      BMI Readings from Last 15 Encounters:   07/22/25 33.11 kg/m²   03/31/25 33.47 kg/m²   12/17/24 34.57 kg/m²   09/15/24 33.84 kg/m²   09/10/24 36.36 kg/m²   08/21/24 36.52 kg/m²   03/19/24 36.52 kg/m²   01/31/24 36.91 kg/m²   12/01/23 36.91 kg/m²   08/25/23 36.33 kg/m²   08/14/23 36.33 kg/m²   01/17/23 37.69 kg/m²   10/11/22 36.13 kg/m²   08/30/22 35.94 kg/m²   07/12/22 35.94 kg/m²      82.1 kg (181 lb) (7/22/2025 11:08 AM)      Physical Exam  Physical Exam  Constitutional:       General: She is not in acute distress.     Appearance: Normal appearance. She is normal weight. She is not ill-appearing, toxic-appearing or diaphoretic.   HENT:      Head: Normocephalic and atraumatic.      Right Ear: Tympanic membrane, ear canal and external ear normal. There is no impacted cerumen.      Left Ear: Tympanic membrane, ear canal and external ear normal. There is no impacted cerumen.      Nose: No congestion or rhinorrhea.      Mouth/Throat:      Pharynx: No oropharyngeal exudate or posterior oropharyngeal  erythema.     Eyes:      General: No scleral icterus.        Right eye: No discharge.         Left eye: No discharge.      Pupils: Pupils are equal, round, and reactive to light.     Neck:      Vascular: No carotid bruit.     Cardiovascular:      Rate and Rhythm: Normal rate.      Pulses: Normal pulses.      Heart sounds: Normal heart sounds. No murmur heard.     No friction rub. No gallop.   Pulmonary:      Effort: No respiratory distress.      Breath sounds: No stridor. No wheezing, rhonchi or rales.   Chest:      Chest wall: No tenderness.   Abdominal:      General: There is no distension.      Palpations: There is no mass.      Tenderness: There is no abdominal tenderness. There is no right CVA tenderness, left CVA tenderness, guarding or rebound.      Hernia: No hernia is present.   Genitourinary:     Vagina: No vaginal discharge.      Rectum: Guaiac result negative.     Musculoskeletal:         General: No swelling, tenderness, deformity or signs of injury.      Cervical back: No rigidity or tenderness.      Right lower leg: No edema.      Left lower leg: No edema.   Lymphadenopathy:      Cervical: No cervical adenopathy.     Skin:     Coloration: Skin is not jaundiced or pale.      Findings: No bruising, erythema, lesion or rash.     Neurological:      Mental Status: She is alert.      Cranial Nerves: No cranial nerve deficit.      Sensory: No sensory deficit.      Motor: No weakness.      Coordination: Coordination normal.      Gait: Gait normal.      Deep Tendon Reflexes: Reflexes normal.     Psychiatric:         Mood and Affect: Mood normal.         Behavior: Behavior normal.         Thought Content: Thought content normal.         Judgment: Judgment normal.          Labs:  CBC:  Recent Labs     01/10/25  1338 09/03/24  1058 01/31/24  1412   WBC 9.1 6.1 7.9   HGB 13.4 14.5 13.9   HCT 41.8 44.5 43.5    275 327   MCV 93 94 92     CMP:  Recent Labs     01/10/25  1338 09/03/24  1058 01/31/24  1412   NA  "141 141 142   K 4.5 4.7 4.3    103 104   CO2 26 29 28   ANIONGAP 13 14 14   BUN 15 16 10   CREATININE 0.62 0.68 0.58   EGFR 88 87 90   GLUCOSE 100* 101* 88     Recent Labs     01/10/25  1338 09/03/24  1058 01/31/24  1412   ALBUMIN 3.9 4.5 4.2   ALKPHOS 30* 42 47   ALT 20 27 26   AST 16 20 19   BILITOT 0.4 0.4 0.3     Calcium/Phos:   Lab Results   Component Value Date    CALCIUM 9.3 01/10/2025      COAG: No results for input(s): \"INR\", \"DDIMERVTE\" in the last 78064 hours.  CRP:   Lab Results   Component Value Date    CRP 1.19 (A) 01/20/2021      [unfilled]   ENDO:  Recent Labs     09/03/24  1058 01/31/24  1412 08/14/23  1047 01/17/23  1207   TSH 0.90 0.59 0.50 0.63   HGBA1C 5.6 5.7* 5.4 5.6      CARDIAC: No results for input(s): \"LDH\", \"CKMB\", \"TROPHS\", \"BNP\" in the last 55371 hours.    No lab exists for component: \"CK\", \"CKMBP\"  Recent Labs     09/03/24  1058 01/31/24  1412 08/14/23  1047 10/11/22  1054 04/08/22  1128   CHOL 246* 316* 247* 234* 301*   LDLF  --   --  139* 130* 195*   LDLCALC 151* 211  --   --   --    HDL 75.7 71.4 77.3 80.4 76.0   TRIG 98 166* 152* 119 148     No data recorded    Current Medications:  Current Medications[5]    Assessment and Plan  Makenna was seen today for follow-up.  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  Left Shoulder X-ray Ordered today.  Patient advised to get Physical Therapy.    Essential " Hypertension  Continue with Nebivolol 10 mg daily.    Hypothyroidism  Continue with Levothyroxine 100 mcg daily.  TSH ordered today.    GERD  Continue with Omeprazole 20 mg.    Essential Tremors  Continue with Propranolol 10 mg twice a day.    Rheumatoid Arthritis   Continue with Methotrexate 2.5 mg 4 tablets a week daily.  Follow up with Rheumatology outpatient.    Hyperlipidemia  Continue with Lipitor 40 mg daily.  Lipid panel ordered today.    Follow up in November.    By optimizing your health through good nutrition, daily exercise, stress management, low/moderate alcohol and avoidance of tobacco, sugar and processed foods, you can help to decrease your risk of cardiovascular disease and stroke and achieve a healthy weight. A diet rich in whole, plant-based foods such as vegetables, beans, seeds, nuts, whole grains, healthy fats and fruit - leads to lower body mass index, blood pressure, HbA1C, and cholesterol levels.         Immunizations:  Immunization History   Administered Date(s) Administered    COVID-19, mRNA, LNP-S, PF, 30 mcg/0.3 mL dose 11/24/2021, 12/17/2021    Flu vaccine, trivalent, preservative free, HIGH-DOSE, age 65y+ (Fluzone) 11/28/2016, 11/07/2017, 08/28/2018, 09/01/2020    Influenza, Unspecified 11/08/2011    Influenza, seasonal, injectable 09/24/2013    Pneumococcal conjugate vaccine, 13-valent (PREVNAR 13) 04/05/2017    Pneumococcal polysaccharide vaccine, 23-valent, age 2 years and older (PNEUMOVAX 23) 01/15/2018    Pneumococcal, Unspecified 11/16/2016         GILBERT PRUITT MD  PGY-2         [1]   Past Medical History:  Diagnosis Date    Body mass index (BMI) 34.0-34.9, adult 08/02/2019    Body mass index (BMI) of 34.0 to 34.9 in adult    Body mass index (BMI) 36.0-36.9, adult 10/05/2021    BMI 36.0-36.9,adult    Body mass index (BMI) 37.0-37.9, adult 07/06/2021    Body mass index (BMI) of 37.0 to 37.9 in adult    Body mass index (BMI) 37.0-37.9, adult 01/04/2022    Body mass index (BMI) of  37.0 to 37.9 in adult    Encounter for screening mammogram for malignant neoplasm of breast     Visit for screening mammogram    Morbid (severe) obesity due to excess calories (Multi) 01/04/2022    Class 2 severe obesity with serious comorbidity and body mass index (BMI) of 37.0 to 37.9 in adult, unspecified obesity type    Morbid (severe) obesity due to excess calories (Multi) 10/05/2021    Class 2 severe obesity with serious comorbidity and body mass index (BMI) of 36.0 to 36.9 in adult, unspecified obesity type    Personal history of other diseases of the digestive system     History of cholelithiasis   [2] No past surgical history on file.  [3] No family history on file.  [4]   Allergies  Allergen Reactions    Lisinopril Cough   [5]   Current Outpatient Medications   Medication Sig Dispense Refill    aspirin 81 mg EC tablet TAKE 1 TABLET (81 MG) BY MOUTH ONCE DAILY. WITH FOOD 90 tablet 0    atorvastatin (Lipitor) 40 mg tablet TAKE 1 TABLET (40 MG) BY MOUTH ONCE DAILY. 90 tablet 1    calcium carbonate-vitamin D3 600 mg-10 mcg (400 unit) tablet TAKE 2 TABLETS BY MOUTH ONCE DAILY. 60 tablet 3    cholecalciferol (Vitamin D3) 50 mcg (2,000 unit) capsule Take 1 capsule (50 mcg) by mouth once daily. 90 capsule 1    diclofenac sodium 1 % kit Place 1 each on the skin.      DULoxetine (Cymbalta) 30 mg DR capsule Take 1 capsule (30 mg) by mouth once daily. 90 capsule 0    FeroSuL tablet TAKE 1 TABLET BY MOUTH ONCE DAILY. 30 tablet 2    fexofenadine (Allegra) 180 mg tablet TAKE 1 TABLET (180 MG) BY MOUTH ONCE DAILY. 30 tablet 3    folic acid (Folvite) 1 mg tablet Take 1 tablet (1 mg) by mouth once daily. 90 tablet 2    ibuprofen 600 mg tablet TAKE 1 TABLET (600 MG) BY MOUTH 3 TIMES A DAY AS NEEDED FOR MILD PAIN (1 - 3) (PAIN). 60 tablet 0    ipratropium (Atrovent HFA) 17 mcg/actuation inhaler Inhale 1 puff  in the morning and 1 puff at noon and 1 puff in the evening and 1 puff before bedtime.      levomilnacipranR (Fetzima)  40 mg extended release capsule Take 1 capsule (40 mg) by mouth once daily. 30 capsule 2    propranolol (Inderal) 10 mg tablet TAKE 1 TABLET (10 MG) BY MOUTH 2 TIMES A DAY. 60 tablet 3    blood pressure monitor kit 1 each once daily. 1 kit 0    ciprofloxacin (Cetraxal) 0.2 % otic solution Administer 0.25 mL into affected ear(s) in the morning and 0.25 mL at noon and 0.25 mL in the evening and 0.25 mL before bedtime.      diclofenac sodium 3 % gel Apply 1 Application topically 2 times a day as needed (Pain). 100 g 2    docusate sodium (Colace) 100 mg capsule Take 1 capsule (100 mg) by mouth in the morning and 1 capsule (100 mg) before bedtime.      fluticasone (Flonase) 50 mcg/actuation nasal spray ADMINISTER 1 SPRAY INTO EACH NOSTRIL ONCE DAILY. 16 g 2    fluticasone propion-salmeteroL (Advair Diskus) 250-50 mcg/dose diskus inhaler Inhale 1 puff.      glucosamine armas 2KCl-chondroit 750-600 mg tablet Take 1 each by mouth.      ketorolac (Acular) 0.5 % ophthalmic solution Administer into both eyes 4 times a day.      levothyroxine (Synthroid, Levoxyl) 100 mcg tablet Take 1.5 tablets 3 times per week and one tablet 4 days per week 120 tablet 1    loratadine (Claritin) 10 mg tablet Take 1 tablet (10 mg) by mouth once daily. 90 tablet 0    meclizine (Antivert) 25 mg tablet TAKE 1 TABLET 3 TIMES DAILY AS NEEDED FOR DIZZINESS 90 tablet 0    methotrexate (Trexall) 2.5 mg tablet Take 1 tablet (2.5 mg total) by mouth 1 (one) time per week.      nebivolol (Bystolic) 10 mg tablet Take 1 tablet (10 mg) by mouth once daily. 90 tablet 1    olopatadine (Patanol) 0.1 % ophthalmic solution Administer 1 drop into both eyes in the morning and 1 drop before bedtime.      omeprazole (PriLOSEC) 20 mg DR capsule Take 1 capsule (20 mg) by mouth once daily.      polyvinyl alcohol (Liquifilm Tears) 1.4 % ophthalmic solution Administer 1 drop into both eyes if needed.      predniSONE (Deltasone) 10 mg tablet Take 1 tablet (10 mg) by mouth once  daily.      predniSONE (Deltasone) 20 mg tablet Take 1 tablet (20 mg) by mouth once daily.      Tab-A-Christel Multivitamin w-iron 15 mg iron- 400 mcg tablet TAKE 1 TABLET BY MOUTH ONCE DAILY. 30 tablet 2    VITAMIN A ORAL Take 1 tablet by mouth once daily.       No current facility-administered medications for this visit.

## 2025-07-23 LAB
CHOLEST SERPL-MCNC: 257 MG/DL
CHOLEST/HDLC SERPL: 3.3 (CALC)
EST. AVERAGE GLUCOSE BLD GHB EST-MCNC: 114 MG/DL
EST. AVERAGE GLUCOSE BLD GHB EST-SCNC: 6.3 MMOL/L
HBA1C MFR BLD: 5.6 %
HDLC SERPL-MCNC: 79 MG/DL
LDLC SERPL CALC-MCNC: 152 MG/DL (CALC)
NONHDLC SERPL-MCNC: 178 MG/DL (CALC)
TRIGL SERPL-MCNC: 134 MG/DL
TSH SERPL-ACNC: 1.55 MIU/L (ref 0.4–4.5)

## 2025-08-11 ENCOUNTER — APPOINTMENT (OUTPATIENT)
Dept: INTEGRATIVE MEDICINE | Facility: CLINIC | Age: 85
End: 2025-08-11
Payer: COMMERCIAL

## 2025-08-11 DIAGNOSIS — M54.59 OTHER LOW BACK PAIN: Primary | ICD-10-CM

## 2025-08-11 DIAGNOSIS — M17.11 OSTEOARTHRITIS OF RIGHT KNEE, UNSPECIFIED OSTEOARTHRITIS TYPE: ICD-10-CM

## 2025-08-11 PROCEDURE — 97810 ACUP 1/> WO ESTIM 1ST 15 MIN: CPT | Performed by: ACUPUNCTURIST

## 2025-08-11 PROCEDURE — 97811 ACUP 1/> W/O ESTIM EA ADD 15: CPT | Performed by: ACUPUNCTURIST

## 2025-08-12 ENCOUNTER — APPOINTMENT (OUTPATIENT)
Dept: INTEGRATIVE MEDICINE | Facility: CLINIC | Age: 85
End: 2025-08-12
Payer: COMMERCIAL

## 2025-08-26 DIAGNOSIS — G25.0 ESSENTIAL TREMOR: ICD-10-CM

## 2025-08-26 RX ORDER — PROPRANOLOL HYDROCHLORIDE 10 MG/1
10 TABLET ORAL 2 TIMES DAILY
Qty: 60 TABLET | Refills: 3 | Status: SHIPPED | OUTPATIENT
Start: 2025-08-26

## 2025-09-02 ENCOUNTER — APPOINTMENT (OUTPATIENT)
Dept: NEUROLOGY | Facility: CLINIC | Age: 85
End: 2025-09-02
Payer: COMMERCIAL

## 2025-09-08 ENCOUNTER — APPOINTMENT (OUTPATIENT)
Dept: INTEGRATIVE MEDICINE | Facility: CLINIC | Age: 85
End: 2025-09-08
Payer: COMMERCIAL

## 2025-10-06 ENCOUNTER — APPOINTMENT (OUTPATIENT)
Dept: INTEGRATIVE MEDICINE | Facility: CLINIC | Age: 85
End: 2025-10-06
Payer: COMMERCIAL

## 2025-11-03 ENCOUNTER — APPOINTMENT (OUTPATIENT)
Dept: PRIMARY CARE | Facility: CLINIC | Age: 85
End: 2025-11-03
Payer: COMMERCIAL

## 2025-11-07 ENCOUNTER — APPOINTMENT (OUTPATIENT)
Dept: INTEGRATIVE MEDICINE | Facility: CLINIC | Age: 85
End: 2025-11-07
Payer: COMMERCIAL

## 2025-12-12 ENCOUNTER — APPOINTMENT (OUTPATIENT)
Dept: INTEGRATIVE MEDICINE | Facility: CLINIC | Age: 85
End: 2025-12-12
Payer: COMMERCIAL